# Patient Record
Sex: FEMALE | Race: WHITE | NOT HISPANIC OR LATINO | URBAN - METROPOLITAN AREA
[De-identification: names, ages, dates, MRNs, and addresses within clinical notes are randomized per-mention and may not be internally consistent; named-entity substitution may affect disease eponyms.]

---

## 2020-02-06 ENCOUNTER — EMERGENCY (EMERGENCY)
Facility: HOSPITAL | Age: 35
LOS: 1 days | Discharge: HOME | End: 2020-02-06
Admitting: EMERGENCY MEDICINE
Payer: MEDICAID

## 2020-02-06 VITALS
SYSTOLIC BLOOD PRESSURE: 114 MMHG | RESPIRATION RATE: 17 BRPM | OXYGEN SATURATION: 98 % | TEMPERATURE: 98 F | HEART RATE: 107 BPM | DIASTOLIC BLOOD PRESSURE: 72 MMHG

## 2020-02-06 PROCEDURE — 99285 EMERGENCY DEPT VISIT HI MDM: CPT
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
## 2020-02-06 NOTE — ED PROVIDER NOTE - PROGRESS NOTE DETAILS
TAHIRA: I was signed out this pt pending PSYCH consult and recs. Psych saw pt, they do not think pt requires admission for her condition. Labs reviewed, pt is alcohol drinker with minimal elevation in LFTs, She also has UTI which was treated here with Keflex, and rest of Rx will be sent to her pharmacy of choice. Will rec to f/u with her PMD to ensure resolution of her UTI.

## 2020-02-06 NOTE — ED PROVIDER NOTE - CLINICAL SUMMARY MEDICAL DECISION MAKING FREE TEXT BOX
34 year old female history of depression with previous SI attempt presents for worsening depression, feeling weak 2/2 not eating and passive SI.  Will do labs, EKG, psych consult.

## 2020-02-06 NOTE — ED ADULT NURSE NOTE - OBJECTIVE STATEMENT
Pt received to  endorsing SI, decreased appetite and body aches upon movement. Pt a&ox3 and ambulatory at baseline, pt calm and cooperative, denies HI, denies plan to harm self. Pt denies auditory and visual hallucinations. CLEM Melgar assessing pt, pt denies alcohol, marijuana, cocaine and heroin use. Will continue to monitor.

## 2020-02-06 NOTE — ED PROVIDER NOTE - PATIENT PORTAL LINK FT
You can access the FollowMyHealth Patient Portal offered by [facility] by registering at the following website: [email or web address]. By joining [employer]’s FollowMyHealth portal, you will also be able to view your health information using other applications (apps) compatible with our system.

## 2020-02-06 NOTE — ED PROVIDER NOTE - NSFOLLOWUPINSTRUCTIONS_ED_ALL_ED_FT
Please follow up with your primary care doctor after you leave the emergency department so that they can follow up and conduct more testing and treatment as they deem necessary. If you have worsening signs or symptoms of what you came in to the Emergency Department today and are not able to see your doctor, go to your nearest emergency department or return to the [facility] emergency department for further care and management.

## 2020-02-07 VITALS
SYSTOLIC BLOOD PRESSURE: 106 MMHG | OXYGEN SATURATION: 98 % | TEMPERATURE: 98 F | DIASTOLIC BLOOD PRESSURE: 75 MMHG | HEART RATE: 91 BPM | RESPIRATION RATE: 18 BRPM

## 2020-02-07 DIAGNOSIS — F32.9 MAJOR DEPRESSIVE DISORDER, SINGLE EPISODE, UNSPECIFIED: ICD-10-CM

## 2020-02-07 LAB
ALBUMIN SERPL ELPH-MCNC: 4.9 G/DL — SIGNIFICANT CHANGE UP (ref 3.3–5)
ALP SERPL-CCNC: 126 U/L — HIGH (ref 40–120)
ALT FLD-CCNC: 49 U/L — HIGH (ref 4–33)
AMPHET UR-MCNC: NEGATIVE — SIGNIFICANT CHANGE UP
ANION GAP SERPL CALC-SCNC: 23 MMO/L — HIGH (ref 7–14)
APAP SERPL-MCNC: < 15 UG/ML — LOW (ref 15–25)
APPEARANCE UR: CLEAR — SIGNIFICANT CHANGE UP
AST SERPL-CCNC: 75 U/L — HIGH (ref 4–32)
BACTERIA # UR AUTO: HIGH
BARBITURATES UR SCN-MCNC: NEGATIVE — SIGNIFICANT CHANGE UP
BASOPHILS # BLD AUTO: 0.04 K/UL — SIGNIFICANT CHANGE UP (ref 0–0.2)
BASOPHILS NFR BLD AUTO: 0.5 % — SIGNIFICANT CHANGE UP (ref 0–2)
BENZODIAZ UR-MCNC: NEGATIVE — SIGNIFICANT CHANGE UP
BILIRUB SERPL-MCNC: 0.5 MG/DL — SIGNIFICANT CHANGE UP (ref 0.2–1.2)
BILIRUB UR-MCNC: NEGATIVE — SIGNIFICANT CHANGE UP
BLOOD UR QL VISUAL: NEGATIVE — SIGNIFICANT CHANGE UP
BUN SERPL-MCNC: 17 MG/DL — SIGNIFICANT CHANGE UP (ref 7–23)
CALCIUM SERPL-MCNC: 10.2 MG/DL — SIGNIFICANT CHANGE UP (ref 8.4–10.5)
CANNABINOIDS UR-MCNC: NEGATIVE — SIGNIFICANT CHANGE UP
CHLORIDE SERPL-SCNC: 103 MMOL/L — SIGNIFICANT CHANGE UP (ref 98–107)
CO2 SERPL-SCNC: 15 MMOL/L — LOW (ref 22–31)
COCAINE METAB.OTHER UR-MCNC: NEGATIVE — SIGNIFICANT CHANGE UP
COLOR SPEC: YELLOW — SIGNIFICANT CHANGE UP
CREAT SERPL-MCNC: 0.71 MG/DL — SIGNIFICANT CHANGE UP (ref 0.5–1.3)
EOSINOPHIL # BLD AUTO: 0 K/UL — SIGNIFICANT CHANGE UP (ref 0–0.5)
EOSINOPHIL NFR BLD AUTO: 0 % — SIGNIFICANT CHANGE UP (ref 0–6)
ETHANOL BLD-MCNC: < 10 MG/DL — SIGNIFICANT CHANGE UP
GLUCOSE SERPL-MCNC: 65 MG/DL — LOW (ref 70–99)
GLUCOSE UR-MCNC: NEGATIVE — SIGNIFICANT CHANGE UP
HCG SERPL-ACNC: < 5 MIU/ML — SIGNIFICANT CHANGE UP
HCT VFR BLD CALC: 38.7 % — SIGNIFICANT CHANGE UP (ref 34.5–45)
HGB BLD-MCNC: 11.9 G/DL — SIGNIFICANT CHANGE UP (ref 11.5–15.5)
HYALINE CASTS # UR AUTO: NEGATIVE — SIGNIFICANT CHANGE UP
IMM GRANULOCYTES NFR BLD AUTO: 0.1 % — SIGNIFICANT CHANGE UP (ref 0–1.5)
KETONES UR-MCNC: HIGH
LEUKOCYTE ESTERASE UR-ACNC: SIGNIFICANT CHANGE UP
LYMPHOCYTES # BLD AUTO: 1.07 K/UL — SIGNIFICANT CHANGE UP (ref 1–3.3)
LYMPHOCYTES # BLD AUTO: 12.6 % — LOW (ref 13–44)
MCHC RBC-ENTMCNC: 21.7 PG — LOW (ref 27–34)
MCHC RBC-ENTMCNC: 30.7 % — LOW (ref 32–36)
MCV RBC AUTO: 70.6 FL — LOW (ref 80–100)
METHADONE UR-MCNC: NEGATIVE — SIGNIFICANT CHANGE UP
MONOCYTES # BLD AUTO: 0.22 K/UL — SIGNIFICANT CHANGE UP (ref 0–0.9)
MONOCYTES NFR BLD AUTO: 2.6 % — SIGNIFICANT CHANGE UP (ref 2–14)
NEUTROPHILS # BLD AUTO: 7.12 K/UL — SIGNIFICANT CHANGE UP (ref 1.8–7.4)
NEUTROPHILS NFR BLD AUTO: 84.2 % — HIGH (ref 43–77)
NITRITE UR-MCNC: POSITIVE — HIGH
NRBC # FLD: 0 K/UL — SIGNIFICANT CHANGE UP (ref 0–0)
OPIATES UR-MCNC: NEGATIVE — SIGNIFICANT CHANGE UP
OXYCODONE UR-MCNC: NEGATIVE — SIGNIFICANT CHANGE UP
PCP UR-MCNC: NEGATIVE — SIGNIFICANT CHANGE UP
PH UR: 5.5 — SIGNIFICANT CHANGE UP (ref 5–8)
PLATELET # BLD AUTO: 352 K/UL — SIGNIFICANT CHANGE UP (ref 150–400)
PMV BLD: 12 FL — SIGNIFICANT CHANGE UP (ref 7–13)
POTASSIUM SERPL-MCNC: 4.9 MMOL/L — SIGNIFICANT CHANGE UP (ref 3.5–5.3)
POTASSIUM SERPL-SCNC: 4.9 MMOL/L — SIGNIFICANT CHANGE UP (ref 3.5–5.3)
PROT SERPL-MCNC: 8.2 G/DL — SIGNIFICANT CHANGE UP (ref 6–8.3)
PROT UR-MCNC: 20 — SIGNIFICANT CHANGE UP
RBC # BLD: 5.48 M/UL — HIGH (ref 3.8–5.2)
RBC # FLD: 16 % — HIGH (ref 10.3–14.5)
RBC CASTS # UR COMP ASSIST: SIGNIFICANT CHANGE UP (ref 0–?)
SALICYLATES SERPL-MCNC: < 5 MG/DL — LOW (ref 15–30)
SODIUM SERPL-SCNC: 141 MMOL/L — SIGNIFICANT CHANGE UP (ref 135–145)
SP GR SPEC: 1.02 — SIGNIFICANT CHANGE UP (ref 1–1.04)
SPECIMEN SOURCE: SIGNIFICANT CHANGE UP
SQUAMOUS # UR AUTO: SIGNIFICANT CHANGE UP
TSH SERPL-MCNC: 0.28 UIU/ML — SIGNIFICANT CHANGE UP (ref 0.27–4.2)
UROBILINOGEN FLD QL: NORMAL — SIGNIFICANT CHANGE UP
WBC # BLD: 8.46 K/UL — SIGNIFICANT CHANGE UP (ref 3.8–10.5)
WBC # FLD AUTO: 8.46 K/UL — SIGNIFICANT CHANGE UP (ref 3.8–10.5)
WBC UR QL: HIGH (ref 0–?)

## 2020-02-07 PROCEDURE — 90792 PSYCH DIAG EVAL W/MED SRVCS: CPT

## 2020-02-07 RX ORDER — NICOTINE POLACRILEX 2 MG
1 GUM BUCCAL ONCE
Refills: 0 | Status: COMPLETED | OUTPATIENT
Start: 2020-02-07 | End: 2020-02-07

## 2020-02-07 RX ORDER — CEPHALEXIN 500 MG
1 CAPSULE ORAL
Qty: 14 | Refills: 0
Start: 2020-02-07 | End: 2020-02-13

## 2020-02-07 RX ORDER — ALPRAZOLAM 0.25 MG
0.25 TABLET ORAL ONCE
Refills: 0 | Status: DISCONTINUED | OUTPATIENT
Start: 2020-02-07 | End: 2020-02-07

## 2020-02-07 RX ORDER — CEPHALEXIN 500 MG
500 CAPSULE ORAL ONCE
Refills: 0 | Status: COMPLETED | OUTPATIENT
Start: 2020-02-07 | End: 2020-02-07

## 2020-02-07 RX ADMIN — Medication 0.25 MILLIGRAM(S): at 03:29

## 2020-02-07 RX ADMIN — Medication 500 MILLIGRAM(S): at 02:16

## 2020-02-07 RX ADMIN — Medication 1 PATCH: at 02:17

## 2020-02-07 NOTE — ED BEHAVIORAL HEALTH ASSESSMENT NOTE - AXIS IV
Problems with primary support/Occupational problems/Problem related to social environment/Educational problems

## 2020-02-07 NOTE — ED BEHAVIORAL HEALTH ASSESSMENT NOTE - SAFETY PLAN DETAILS
Patient advised and agreed to return to ED or nearest hospital or call 911 for any worsening symptoms.

## 2020-02-07 NOTE — ED ADULT NURSE REASSESSMENT NOTE - NS ED NURSE REASSESS COMMENT FT1
Pt discharged by MD; medicated as ordered; exit vitals as noted. Pt verbalizes that brother will be driving; verbalizes understanding of prescribed med for UTI.  Pt received discharge paperwork from MD; left ER accompanied by her brother.

## 2020-02-07 NOTE — ED BEHAVIORAL HEALTH ASSESSMENT NOTE - SUICIDE RISK FACTORS
Impulsivity/Current mood episode/Mood Disorder current/past/Alcohol/Substance abuse disorders/Agitation/Severe Anxiety/Panic/Hopelessness or despair/History of abuse/trauma
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
## 2020-02-07 NOTE — ED BEHAVIORAL HEALTH ASSESSMENT NOTE - SUMMARY
Patient is a 34 year old  woman, with self reported history of depression and substance use, with remote hospitalization and suicide attempt and self injurious behavior, who presents brought in by brother for depression.    Patient denies acute symptoms of depression, yaz, anxiety, psychosis, suicidal/homicidal ideations, intent or plans, denies auditory/visual hallucinations.  Patient does not represent an imminent threat of danger to self or others at this time.  Patient does not meet criteria for inpatient involuntary hospitalization.  Patient refused voluntary admission.  Patient will be discharged home and patient and brother agree to discharge disposition.  No acute safety concerns.

## 2020-02-07 NOTE — ED BEHAVIORAL HEALTH ASSESSMENT NOTE - REFERRAL / APPOINTMENT DETAILS
Patient given information for [facility] in [place]. Patient given information for crisis clinic and substance use referrals
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
## 2020-02-07 NOTE — ED BEHAVIORAL HEALTH ASSESSMENT NOTE - DETAILS
N/A states yeah doesn't everybody have that kind of history." patient and brother aware of disposition and plan

## 2020-02-07 NOTE — ED BEHAVIORAL HEALTH ASSESSMENT NOTE - DESCRIPTION
Patient was relatively calm, but demanding in the ED and did not exhibit any aggression. Patient did not require any PRN medications or any physical restraints.    Vital Signs Last 24 Hrs  T(C): 36.7 (07 Feb 2020 03:30), Max: 36.7 (06 Feb 2020 22:54)  T(F): 98.1 (07 Feb 2020 03:30), Max: 98.1 (06 Feb 2020 22:54)  HR: 91 (07 Feb 2020 03:30) (91 - 107)  BP: 106/75 (07 Feb 2020 03:30) (106/75 - 114/72)  BP(mean): --  RR: 18 (07 Feb 2020 03:30) (17 - 18)  SpO2: 98% (07 Feb 2020 03:30) (98% - 98%) denies lives alone, previously employed in administration

## 2020-02-07 NOTE — ED BEHAVIORAL HEALTH ASSESSMENT NOTE - RISK ASSESSMENT
Acute Suicide Risk Low   Rationale:   Protective factors include no access to firearms, no global insomnia, supportive family and social supports, no suicidal/homicidal ideations intent or plans, hopefulness for future    Risk factors of history of prior suicide attempts, self injurious behaviors, history of psychiatric disorders including mood disorders, history of substance use; symptoms of impulsivity, hopelessness, triggering events leading to shame, humiliation, or despair    Elevated Chronic Risk   YES secondary to substance use Low Acute Suicide Risk

## 2020-02-09 LAB
-  AMIKACIN: SIGNIFICANT CHANGE UP
-  AMPICILLIN/SULBACTAM: SIGNIFICANT CHANGE UP
-  AMPICILLIN: SIGNIFICANT CHANGE UP
-  AZTREONAM: SIGNIFICANT CHANGE UP
-  CEFAZOLIN: SIGNIFICANT CHANGE UP
-  CEFEPIME: SIGNIFICANT CHANGE UP
-  CEFOXITIN: SIGNIFICANT CHANGE UP
-  CEFTAZIDIME: SIGNIFICANT CHANGE UP
-  CEFTRIAXONE: SIGNIFICANT CHANGE UP
-  ERTAPENEM: SIGNIFICANT CHANGE UP
-  GENTAMICIN: SIGNIFICANT CHANGE UP
-  IMIPENEM: SIGNIFICANT CHANGE UP
-  LEVOFLOXACIN: SIGNIFICANT CHANGE UP
-  MEROPENEM: SIGNIFICANT CHANGE UP
-  NITROFURANTOIN: SIGNIFICANT CHANGE UP
-  PIPERACILLIN/TAZOBACTAM: SIGNIFICANT CHANGE UP
-  TIGECYCLINE: SIGNIFICANT CHANGE UP
-  TOBRAMYCIN: SIGNIFICANT CHANGE UP
-  TRIMETHOPRIM/SULFAMETHOXAZOLE: SIGNIFICANT CHANGE UP
BACTERIA UR CULT: SIGNIFICANT CHANGE UP
METHOD TYPE: SIGNIFICANT CHANGE UP
ORGANISM # SPEC MICROSCOPIC CNT: SIGNIFICANT CHANGE UP
ORGANISM # SPEC MICROSCOPIC CNT: SIGNIFICANT CHANGE UP

## 2020-03-01 ENCOUNTER — OUTPATIENT (OUTPATIENT)
Dept: OUTPATIENT SERVICES | Facility: HOSPITAL | Age: 35
LOS: 1 days | End: 2020-03-01
Payer: MEDICAID

## 2020-03-01 PROCEDURE — G9001: CPT

## 2020-04-09 DIAGNOSIS — Z71.89 OTHER SPECIFIED COUNSELING: ICD-10-CM

## 2020-04-09 PROBLEM — F32.9 MAJOR DEPRESSIVE DISORDER, SINGLE EPISODE, UNSPECIFIED: Chronic | Status: ACTIVE | Noted: 2020-02-07

## 2020-08-21 NOTE — ED ADULT TRIAGE NOTE - RESPIRATORY RATE (BREATHS/MIN)
----- Message from Sergio Sen sent at 8/19/2020 11:56 AM CDT -----  Regarding: test results  Contact: patient  Type:  Test Results    Who Called:  Patient   Name of Test (Lab/Mammo/Etc):  Labwork  Date of Test:  August 13th  Ordering Provider:  Dr. Bailey  Where the test was performed:  [facility] Call Back Number:  [phone]    Case number [id number]      
Overall labs look good; cholesterol slight elevation so diet/exercise  
Pt is requesting lab results. Please advise.  
lvm for pt to call back office regarding results   
17

## 2020-09-24 NOTE — ED BEHAVIORAL HEALTH ASSESSMENT NOTE - THOUGHT CONTENT
Patient Education     Urinary Incontinence, Female (Adult)  Urinary incontinence means loss of control of the bladder. This problem affects many women, especially as they get older. If you have incontinence, you may be embarrassed to ask for help. But know that this problem can be treated.  Types of Incontinence  There are different types of incontinence. Two of the main types are described here. You can have more than one type.    Stress incontinence. With this type, urine leaks when pressure (stress) is put on the bladder. This may happen when you cough, sneeze, or laugh. Stress incontinence most often occurs because the pelvic floor muscles that support the bladder and urethra are weak. This can happen after pregnancy and vaginal childbirth or a hysterectomy. It can also be due to excess body weight or hormone changes.    Urge incontinence (also called overactive bladder). With this type, a sudden urge to urinate is felt often. This may happen even though there may not be much urine in the bladder. The need to urinate often during the night is common. Urge incontinence most often occurs because of bladder spasms. This may be due to bladder irritation or infection. Damage to bladder nerves or pelvic muscles, constipation, and certain medicines can also lead to urge incontinence.  Treatment of urinary incontinence depends on the cause. Further evaluation is needed to find the type you have. This will likely include an exam and certain tests. Based on the results, you and your healthcare provider can then plan treatment. Until a diagnosis is made, the home care tips below can help relieve symptoms.  Home care    Do pelvic floor muscle exercises, if they are prescribed. The pelvic floor muscles help support the bladder and urethra. Many women find that their symptoms improve when doing special exercises that strengthen these muscles. To do the exercises contract the muscles you would use to stop your stream of  urine, but do this when you re not urinating. Hold for 10 seconds, then relax. Repeat 10 to 20 times in a row, at least 3 times a day. Your provider may give you other instructions for how to do the exercises and how often.    Keep a bladder diary. This helps track how often and how much you urinate over a set period of time. Bring this diary with you to your next visit with the provider. The information can help your provider learn more about your bladder problem.    Lose weight, if advised to by your provider. Excess weight puts pressure on the bladder. Your provider can help you create a weight-loss plan that s right for you. This may include exercising more and making certain diet changes.    Don't consume foods and drinks that may irritate the bladder. These can include alcohol and caffeinated drinks.    Quit smoking. Smoking and other tobacco use can lead to chronic cough that strains the pelvic floor muscles. Smoking may also damage the bladder and urethra. Talk with your provider about treatments or methods you can use to quit smoking.    If drinking large amounts of fluid causes you to have symptoms, you may be advised to limit your fluid intake. You may also be advised to drink most of your fluids during the day and to limit fluids at night.    If you re worried about urine leakage or accidents, you may wear absorbent pads to catch urine. Change the pads often. This helps reduce discomfort. It may also reduce the risk of skin or bladder infections.  Follow-up care  Follow up with your healthcare provider, or as directed. It may take some to find the right treatment for your problem. Your treatment plan may include special therapies or medicines. Certain procedures or surgery may also be options. Be sure to discuss any questions you have with your provider.  When to seek medical advice  Call the healthcare provider right away if any of these occur:    Fever of 100.4 F (38 C) or higher, or as directed by  your provider    Bladder pain or fullness    Abdominal swelling    Nausea or vomiting    Back pain    Weakness, dizziness or fainting  Date Last Reviewed: 10/1/2017    3431-1092 [employer]. [address] [place] [address], [place] [address]. All rights reserved. This information is not intended as a substitute for professional medical care. Always follow your healthcare professional's instructions.            Unremarkable

## 2021-11-16 ENCOUNTER — INPATIENT (INPATIENT)
Facility: HOSPITAL | Age: 36
LOS: 8 days | Discharge: HOME | End: 2021-11-25
Attending: INTERNAL MEDICINE | Admitting: INTERNAL MEDICINE
Payer: MEDICAID

## 2021-11-16 VITALS
SYSTOLIC BLOOD PRESSURE: 111 MMHG | HEART RATE: 88 BPM | HEIGHT: 61 IN | TEMPERATURE: 98 F | OXYGEN SATURATION: 98 % | DIASTOLIC BLOOD PRESSURE: 76 MMHG | RESPIRATION RATE: 18 BRPM

## 2021-11-16 PROCEDURE — 93010 ELECTROCARDIOGRAM REPORT: CPT

## 2021-11-16 PROCEDURE — 99285 EMERGENCY DEPT VISIT HI MDM: CPT | Mod: 25

## 2021-11-16 NOTE — ED PROVIDER NOTE - NSFOLLOWUPINSTRUCTIONS_ED_ALL_ED_FT
You have been given information necessary to follow up with the  [facility] ([facility]) Crisis center & other outpatient  psychiatric clinics within your community    • [facility] walk in Crisis centre  [id number] [place] [address] [phone] [email or web address]  Hours of operation:  Day	                                        Hours  Sunday                                  Closed  Monday                                9am - 3pm  Tuesday                                9am - 3pm  Wednesday                          9am - 3pm  Thursday                               9am - 3pm  Friday                                    9am - 3pm  Saturday                                Closed    .....additionally if your current problem is associated with drug or alcohol abuse further information can be obtained at the Drug Abuse Evaluation Health Referral Servce ([facility])    • [facility] clinic [address] [place] [address] [phone] [email or web address]-service    Additionally if more support and information and help is needed in the area of suicide prevention pleas3 feel free to contact :   • Su[place] [address]  Phone: 2-209-987-RMNN (2949)  Web Address: http://www.suicidepreventionlifeline.org  • Suicide Awareness Voices of Education  [address] [place] [address] [place], [place] [id number]  Phone: 1-489.186.1072  Web Address: http://www.[employer].org    -----  [facility] - Urology  Urology  [address] [facility], 3rd & 4th [place] [address]  Phone: [phone]    Urinary Tract Infection in Women    WHAT YOU NEED TO KNOW:    A urinary tract infection (UTI) is caused by bacteria that get inside your urinary tract. Most bacteria that enter your urinary tract come out when you urinate. If the bacteria stay in your urinary tract, you may get an infection. Your urinary tract includes your kidneys, ureters, bladder, and urethra. Urine is made in your kidneys, and it flows from the ureters to the bladder. Urine leaves the bladder through the urethra. A UTI is more common in your lower urinary tract, which includes your bladder and urethra.  Female Urinary System    DISCHARGE INSTRUCTIONS:    Seek care immediately if:    You are urinating very little or not at all.    You have a high fever with shaking chills.    You have side or back pain that gets worse.  Call your doctor if:    You have a fever.    You do not feel better after 2 days of taking antibiotics.    You are vomiting.    You have questions or concerns about your condition or care.  Medicines:    Antibiotics help fight a bacterial infection. If you have UTIs often (called recurrent UTIs), you may be given antibiotics to take regularly. You will be given directions for when and how to use antibiotics. The goal is to prevent UTIs but not cause antibiotic resistance by using antibiotics too often.    Medicines may be given to decrease pain and burning when you urinate. They will also help decrease the feeling that you need to urinate often. These medicines will make your urine orange or red.    Take your medicine as directed. Contact your healthcare provider if you think your medicine is not helping or if you have side effects. Tell him or her if you are allergic to any medicine. Keep a list of the medicines, vitamins, and herbs you take. Include the amounts, and when and why you take them. Bring the list or the pill bottles to follow-up visits. Carry your medicine list with you in case of an emergency.  Follow up with your healthcare provider as directed: Write down your questions so you remember to ask them during your visits.    Prevent another UTI:    Empty your bladder often. Urinate and empty your bladder as soon as you feel the need. Do not hold your urine for long periods of time.    Wipe from front to back after you urinate or have a bowel movement. This will help prevent germs from getting into your urinary tract through your urethra.    Drink liquids as directed. Ask how much liquid to drink each day and which liquids are best for you. You may need to drink more liquids than usual to help flush out the bacteria. Do not drink alcohol, caffeine, or citrus juices. These can irritate your bladder and increase your symptoms. Your healthcare provider may recommend cranberry juice to help prevent a UTI.    Urinate after you have sex. This can help flush out bacteria passed during sex.    Do not douche or use feminine deodorants. These can change the chemical balance in your vagina.    Change sanitary pads or tampons often. This will help prevent germs from getting into your urinary tract.    Talk to your healthcare provider about your birth control method. You may need to change your method if it is increasing your risk for UTIs.    Wear cotton underwear and clothes that are loose. Tight pants and nylon underwear can trap moisture and cause bacteria to grow.    Vaginal estrogen may be recommended. This medicine helps prevent UTIs in women who have gone through menopause or are in bere-menopause.    Do pelvic muscle exercises often. Pelvic muscle exercises may help you start and stop urinating. Strong pelvic muscles may help you empty your bladder easier. Squeeze these muscles tightly for 5 seconds like you are trying to hold back urine. Then relax for 5 seconds. Gradually work up to squeezing for 10 seconds. Do 3 sets of 15 repetitions a day, or as directed.

## 2021-11-16 NOTE — ED PROVIDER NOTE - PHYSICAL EXAMINATION
GENERAL: A&Ox3, non-toxic appearing, no acute distress  HEENT: NCAT, EOMI, oral mucosa moist, normal conjunctiva  RESP: CTAB, no respiratory distress, no wheezes/rhonchi/rales, speaking in full sentences  CV: RRR, no murmurs/rubs/gallops  ABDOMEN: soft, non-tender, non-distended, no guarding, no CVA tenderness  MSK: no visible deformities  NEURO: no focal sensory or motor deficits, CN 2-12 grossly intact, ataxic gait  SKIN: warm, normal color, well perfused, no rash  PSYCH: normal affect

## 2021-11-16 NOTE — ED PROVIDER NOTE - CLINICAL SUMMARY MEDICAL DECISION MAKING FREE TEXT BOX
David Beck, PGY3: 35 year old female p/w major depression, difficulty walking, and decreased PO intake. C/f MDD. Plan for psych clearance labs, IVF, likely admit to medicine vs psych.

## 2021-11-16 NOTE — ED ADULT TRIAGE NOTE - CHIEF COMPLAINT QUOTE
Pt brought in by EMS from home for evaluation for psych and medical. Pts mother said she has had a decrease in PO intake and has had weight loss. Pt states she has been feeling depressed denies SI/HI. Pt denies chest pain, sob, n/v/d, fever or chills. Pt brought in by EMS from home for evaluation for psych and medical. Pts mother said she has had a decrease in PO intake and has had weight loss. Pt states she has been feeling depressed denies SI/HI. Pt denies chest pain, sob, n/v/d, fever or chills.  [phone] mom Pt brought in by EMS from home for evaluation for psych and medical. Pts mother said she has had a decrease in PO intake and has had weight loss. Pt states she has been feeling depressed denies SI/HI. Pt denies chest pain, sob, n/v/d, fever or chills. Pts mom states she lives with her brother and is not sure that is a safe option because when she is there she is not happy or eating properly.   [phone] mom

## 2021-11-16 NOTE — ED ADULT NURSE NOTE - NSIMPLEMENTINTERV_GEN_ALL_ED
Implemented All Fall Risk Interventions:  [place] to call system. Call bell, personal items and telephone within reach. Instruct patient to call for assistance. Room bathroom lighting operational. Non-slip footwear when patient is off stretcher. Physically safe environment: no spills, clutter or unnecessary equipment. Stretcher in lowest position, wheels locked, appropriate side rails in place. Provide visual cue, wrist band, yellow gown, etc. Monitor gait and stability. Monitor for mental status changes and reorient to person, place, and time. Review medications for side effects contributing to fall risk. Reinforce activity limits and safety measures with patient and family.

## 2021-11-16 NOTE — ED PROVIDER NOTE - PROGRESS NOTE DETAILS
David Beck, PGY3: Patient medically cleared, psych consulted. Will see patient in ED. David Beck, PGY3: Patient cleared by psych. Patient offered medical admission for tremors and patient declined. Labs non-actionable, no overt signs of malnutrition. UA+, will tx w/ Keflex. Discussed return precautions and all questions answered. Pt in agreement w/ plan. CAOx3, NAD, VSS. Stable for d/c. David Beck, PGY3: Brother's cell "out of service" when attempted at [phone]. Left VM with mother at [phone]. David Beck, PGY3: Spoke w/ patient's mother. Patient now agreeable for medical admission. Hospitalist paged.

## 2021-11-16 NOTE — ED PROVIDER NOTE - ATTENDING CONTRIBUTION TO CARE
35 year old with decreased po intake and weight loss. most likely psych. concern for severe electrolyte abn. labs lytes, fluids, psych cx, admission med vs psych depending on lab results

## 2021-11-16 NOTE — ED PROVIDER NOTE - OBJECTIVE STATEMENT
35 year old female presents to ED for depression. Patient states she currently lives with her brother and barely eats. She will drink ensure, eat some protein bars, but mainly only eats 1 meal per day. She has been having difficulty walking recently and "feels wobbly". She states she feels like she should weigh 20 lbs more. She states she vapes a lot and smokes CBD to help her sleep. She denies other recreational drug or alcohol use. Mother called 911 because she was concerned that patient is not eating and is not safe at the brother's house. Patient states she has hx of depression, was on Wellbutrin as a teen but no longer taking, does not follow w/ psychiatrist. Had been admitted for psych in past, many years ago. She denies active SI/HI/AVH. She denies f/c, cp, abd pain, n/v/d, or dysuria. LMP "few weeks ago".

## 2021-11-16 NOTE — ED PROVIDER NOTE - PATIENT PORTAL LINK FT
You can access the FollowMyHealth Patient Portal offered by [facility] by registering at the following website: [email or web address]. By joining [employer]’s FollowMyHealth portal, you will also be able to view your health information using other applications (apps) compatible with our system.

## 2021-11-16 NOTE — ED PROVIDER NOTE - NS ED ROS FT
GENERAL: no fever, no chills  EYES: no change in vision, no irritation, no discharge, no redness, no pain  HEENT: no trouble swallowing or speaking, no throat pain, no ear pain  CARDIAC: no chest pain, no palpitations   PULMONARY: no cough, no shortness of breath, no wheezing  GI: no abdominal pain, no nausea, no vomiting, no diarrhea, no constipation  : no changes in urination, no dysuria, no frequency  SKIN: no rashes  NEURO: no headache, no numbness, +weakness  MSK: no joint pain, no muscle pain, no back pain, no calf pain  PSYCH: +depression

## 2021-11-16 NOTE — ED PROVIDER NOTE - CARE PLAN
1 Principal Discharge DX:	Major depression   Principal Discharge DX:	Major depression  Secondary Diagnosis:	UTI (urinary tract infection)   Principal Discharge DX:	FTT (failure to thrive) in adult  Secondary Diagnosis:	UTI (urinary tract infection)

## 2021-11-16 NOTE — ED ADULT NURSE NOTE - OBJECTIVE STATEMENT
Pt received in rm 10. Brought from home, accompanied by EMS and mother. States she has decreased PO intake with weight loss and has been feeling depressed. Pt also states has not been able to walk properly because she feels weak. Pt currently lives with older brother and his family. Verbalized feeling safe living there but "uncomfortable." Denies SI/HI. A&Ox4, ambulatory at baseline. Breathing even and unlabored. Vitals stable as noted. Pt denies abd pain, chest pain, headache, dizziness, SOB, nausea or vomiting. 20G IV placed on left AC. Labs drawn and sent. Pending urine samples. Will continue to monitor.

## 2021-11-16 NOTE — ED ADULT NURSE NOTE - CHIEF COMPLAINT QUOTE
Pt brought in by EMS from home for evaluation for psych and medical. Pts mother said she has had a decrease in PO intake and has had weight loss. Pt states she has been feeling depressed denies SI/HI. Pt denies chest pain, sob, n/v/d, fever or chills. Pts mom states she lives with her brother and is not sure that is a safe option because when she is there she is not happy or eating properly.   [phone] mom

## 2021-11-17 DIAGNOSIS — Z98.890 OTHER SPECIFIED POSTPROCEDURAL STATES: Chronic | ICD-10-CM

## 2021-11-17 DIAGNOSIS — F41.9 ANXIETY DISORDER, UNSPECIFIED: ICD-10-CM

## 2021-11-17 DIAGNOSIS — R62.7 ADULT FAILURE TO THRIVE: ICD-10-CM

## 2021-11-17 DIAGNOSIS — R45.851 SUICIDAL IDEATIONS: ICD-10-CM

## 2021-11-17 DIAGNOSIS — N39.0 URINARY TRACT INFECTION, SITE NOT SPECIFIED: ICD-10-CM

## 2021-11-17 DIAGNOSIS — R26.81 UNSTEADINESS ON FEET: ICD-10-CM

## 2021-11-17 DIAGNOSIS — F32.9 MAJOR DEPRESSIVE DISORDER, SINGLE EPISODE, UNSPECIFIED: ICD-10-CM

## 2021-11-17 DIAGNOSIS — Z29.9 ENCOUNTER FOR PROPHYLACTIC MEASURES, UNSPECIFIED: ICD-10-CM

## 2021-11-17 LAB
ALBUMIN SERPL ELPH-MCNC: 4.4 G/DL — SIGNIFICANT CHANGE UP (ref 3.3–5)
ALBUMIN SERPL ELPH-MCNC: 4.9 G/DL — SIGNIFICANT CHANGE UP (ref 3.3–5)
ALP SERPL-CCNC: 119 U/L — SIGNIFICANT CHANGE UP (ref 40–120)
ALP SERPL-CCNC: 136 U/L — HIGH (ref 40–120)
ALT FLD-CCNC: 13 U/L — SIGNIFICANT CHANGE UP (ref 4–33)
ALT FLD-CCNC: 14 U/L — SIGNIFICANT CHANGE UP (ref 4–33)
ANION GAP SERPL CALC-SCNC: 11 MMOL/L — SIGNIFICANT CHANGE UP (ref 7–14)
ANION GAP SERPL CALC-SCNC: 12 MMOL/L — SIGNIFICANT CHANGE UP (ref 7–14)
APAP SERPL-MCNC: <5 UG/ML — LOW (ref 15–25)
APPEARANCE UR: ABNORMAL
AST SERPL-CCNC: 14 U/L — SIGNIFICANT CHANGE UP (ref 4–32)
AST SERPL-CCNC: 19 U/L — SIGNIFICANT CHANGE UP (ref 4–32)
BASOPHILS # BLD AUTO: 0.05 K/UL — SIGNIFICANT CHANGE UP (ref 0–0.2)
BASOPHILS # BLD AUTO: 0.21 K/UL — HIGH (ref 0–0.2)
BASOPHILS NFR BLD AUTO: 0.8 % — SIGNIFICANT CHANGE UP (ref 0–2)
BASOPHILS NFR BLD AUTO: 2.7 % — HIGH (ref 0–2)
BILIRUB SERPL-MCNC: 0.4 MG/DL — SIGNIFICANT CHANGE UP (ref 0.2–1.2)
BILIRUB SERPL-MCNC: 0.5 MG/DL — SIGNIFICANT CHANGE UP (ref 0.2–1.2)
BILIRUB UR-MCNC: NEGATIVE — SIGNIFICANT CHANGE UP
BUN SERPL-MCNC: 19 MG/DL — SIGNIFICANT CHANGE UP (ref 7–23)
BUN SERPL-MCNC: 21 MG/DL — SIGNIFICANT CHANGE UP (ref 7–23)
CALCIUM SERPL-MCNC: 10 MG/DL — SIGNIFICANT CHANGE UP (ref 8.4–10.5)
CALCIUM SERPL-MCNC: 9.4 MG/DL — SIGNIFICANT CHANGE UP (ref 8.4–10.5)
CHLORIDE SERPL-SCNC: 105 MMOL/L — SIGNIFICANT CHANGE UP (ref 98–107)
CHLORIDE SERPL-SCNC: 108 MMOL/L — HIGH (ref 98–107)
CHOLEST SERPL-MCNC: 113 MG/DL — SIGNIFICANT CHANGE UP
CO2 SERPL-SCNC: 23 MMOL/L — SIGNIFICANT CHANGE UP (ref 22–31)
CO2 SERPL-SCNC: 25 MMOL/L — SIGNIFICANT CHANGE UP (ref 22–31)
COLOR SPEC: YELLOW — SIGNIFICANT CHANGE UP
CREAT SERPL-MCNC: 0.61 MG/DL — SIGNIFICANT CHANGE UP (ref 0.5–1.3)
CREAT SERPL-MCNC: 0.75 MG/DL — SIGNIFICANT CHANGE UP (ref 0.5–1.3)
DIFF PNL FLD: NEGATIVE — SIGNIFICANT CHANGE UP
EOSINOPHIL # BLD AUTO: 0.2 K/UL — SIGNIFICANT CHANGE UP (ref 0–0.5)
EOSINOPHIL # BLD AUTO: 0.21 K/UL — SIGNIFICANT CHANGE UP (ref 0–0.5)
EOSINOPHIL NFR BLD AUTO: 2.7 % — SIGNIFICANT CHANGE UP (ref 0–6)
EOSINOPHIL NFR BLD AUTO: 3.1 % — SIGNIFICANT CHANGE UP (ref 0–6)
ETHANOL SERPL-MCNC: <10 MG/DL — SIGNIFICANT CHANGE UP
FOLATE SERPL-MCNC: 18.7 NG/ML — HIGH (ref 3.1–17.5)
GLUCOSE SERPL-MCNC: 86 MG/DL — SIGNIFICANT CHANGE UP (ref 70–99)
GLUCOSE SERPL-MCNC: 87 MG/DL — SIGNIFICANT CHANGE UP (ref 70–99)
GLUCOSE UR QL: NEGATIVE — SIGNIFICANT CHANGE UP
HCG SERPL-ACNC: <5 MIU/ML — SIGNIFICANT CHANGE UP
HCT VFR BLD CALC: 36.6 % — SIGNIFICANT CHANGE UP (ref 34.5–45)
HCT VFR BLD CALC: 39.8 % — SIGNIFICANT CHANGE UP (ref 34.5–45)
HDLC SERPL-MCNC: 44 MG/DL — LOW
HGB BLD-MCNC: 12.1 G/DL — SIGNIFICANT CHANGE UP (ref 11.5–15.5)
HGB BLD-MCNC: 12.9 G/DL — SIGNIFICANT CHANGE UP (ref 11.5–15.5)
IANC: 3.2 K/UL — SIGNIFICANT CHANGE UP (ref 1.5–8.5)
IANC: 4.34 K/UL — SIGNIFICANT CHANGE UP (ref 1.5–8.5)
IMM GRANULOCYTES NFR BLD AUTO: 0.3 % — SIGNIFICANT CHANGE UP (ref 0–1.5)
KETONES UR-MCNC: NEGATIVE — SIGNIFICANT CHANGE UP
LEUKOCYTE ESTERASE UR-ACNC: ABNORMAL
LIPID PNL WITH DIRECT LDL SERPL: 61 MG/DL — SIGNIFICANT CHANGE UP
LYMPHOCYTES # BLD AUTO: 1.8 K/UL — SIGNIFICANT CHANGE UP (ref 1–3.3)
LYMPHOCYTES # BLD AUTO: 2.45 K/UL — SIGNIFICANT CHANGE UP (ref 1–3.3)
LYMPHOCYTES # BLD AUTO: 23.4 % — SIGNIFICANT CHANGE UP (ref 13–44)
LYMPHOCYTES # BLD AUTO: 38.5 % — SIGNIFICANT CHANGE UP (ref 13–44)
MAGNESIUM SERPL-MCNC: 2.4 MG/DL — SIGNIFICANT CHANGE UP (ref 1.6–2.6)
MAGNESIUM SERPL-MCNC: 2.4 MG/DL — SIGNIFICANT CHANGE UP (ref 1.6–2.6)
MCHC RBC-ENTMCNC: 22.5 PG — LOW (ref 27–34)
MCHC RBC-ENTMCNC: 22.5 PG — LOW (ref 27–34)
MCHC RBC-ENTMCNC: 32.4 GM/DL — SIGNIFICANT CHANGE UP (ref 32–36)
MCHC RBC-ENTMCNC: 33.1 GM/DL — SIGNIFICANT CHANGE UP (ref 32–36)
MCV RBC AUTO: 68 FL — LOW (ref 80–100)
MCV RBC AUTO: 69.3 FL — LOW (ref 80–100)
MONOCYTES # BLD AUTO: 0.28 K/UL — SIGNIFICANT CHANGE UP (ref 0–0.9)
MONOCYTES # BLD AUTO: 0.45 K/UL — SIGNIFICANT CHANGE UP (ref 0–0.9)
MONOCYTES NFR BLD AUTO: 3.6 % — SIGNIFICANT CHANGE UP (ref 2–14)
MONOCYTES NFR BLD AUTO: 7.1 % — SIGNIFICANT CHANGE UP (ref 2–14)
NEUTROPHILS # BLD AUTO: 3.2 K/UL — SIGNIFICANT CHANGE UP (ref 1.8–7.4)
NEUTROPHILS # BLD AUTO: 4.38 K/UL — SIGNIFICANT CHANGE UP (ref 1.8–7.4)
NEUTROPHILS NFR BLD AUTO: 50.2 % — SIGNIFICANT CHANGE UP (ref 43–77)
NEUTROPHILS NFR BLD AUTO: 56.8 % — SIGNIFICANT CHANGE UP (ref 43–77)
NITRITE UR-MCNC: POSITIVE
NON HDL CHOLESTEROL: 69 MG/DL — SIGNIFICANT CHANGE UP
NRBC # BLD: 0 /100 WBCS — SIGNIFICANT CHANGE UP
NRBC # FLD: 0 K/UL — SIGNIFICANT CHANGE UP
PCP SPEC-MCNC: SIGNIFICANT CHANGE UP
PH UR: 6.5 — SIGNIFICANT CHANGE UP (ref 5–8)
PHOSPHATE SERPL-MCNC: 4.3 MG/DL — SIGNIFICANT CHANGE UP (ref 2.5–4.5)
PLATELET # BLD AUTO: 312 K/UL — SIGNIFICANT CHANGE UP (ref 150–400)
PLATELET # BLD AUTO: 317 K/UL — SIGNIFICANT CHANGE UP (ref 150–400)
POTASSIUM SERPL-MCNC: 4.2 MMOL/L — SIGNIFICANT CHANGE UP (ref 3.5–5.3)
POTASSIUM SERPL-MCNC: 4.4 MMOL/L — SIGNIFICANT CHANGE UP (ref 3.5–5.3)
POTASSIUM SERPL-SCNC: 4.2 MMOL/L — SIGNIFICANT CHANGE UP (ref 3.5–5.3)
POTASSIUM SERPL-SCNC: 4.4 MMOL/L — SIGNIFICANT CHANGE UP (ref 3.5–5.3)
PROT SERPL-MCNC: 7 G/DL — SIGNIFICANT CHANGE UP (ref 6–8.3)
PROT SERPL-MCNC: 8.1 G/DL — SIGNIFICANT CHANGE UP (ref 6–8.3)
PROT UR-MCNC: ABNORMAL
RBC # BLD: 5.38 M/UL — HIGH (ref 3.8–5.2)
RBC # BLD: 5.74 M/UL — HIGH (ref 3.8–5.2)
RBC # FLD: 15.3 % — HIGH (ref 10.3–14.5)
RBC # FLD: 15.3 % — HIGH (ref 10.3–14.5)
SALICYLATES SERPL-MCNC: <0.3 MG/DL — LOW (ref 15–30)
SARS-COV-2 RNA SPEC QL NAA+PROBE: SIGNIFICANT CHANGE UP
SODIUM SERPL-SCNC: 142 MMOL/L — SIGNIFICANT CHANGE UP (ref 135–145)
SODIUM SERPL-SCNC: 142 MMOL/L — SIGNIFICANT CHANGE UP (ref 135–145)
SP GR SPEC: 1.03 — SIGNIFICANT CHANGE UP (ref 1–1.05)
TRIGL SERPL-MCNC: 42 MG/DL — SIGNIFICANT CHANGE UP
TSH SERPL-MCNC: 1.41 UIU/ML — SIGNIFICANT CHANGE UP (ref 0.27–4.2)
UROBILINOGEN FLD QL: SIGNIFICANT CHANGE UP
VIT B12 SERPL-MCNC: 884 PG/ML — SIGNIFICANT CHANGE UP (ref 200–900)
WBC # BLD: 6.37 K/UL — SIGNIFICANT CHANGE UP (ref 3.8–10.5)
WBC # BLD: 7.71 K/UL — SIGNIFICANT CHANGE UP (ref 3.8–10.5)
WBC # FLD AUTO: 6.37 K/UL — SIGNIFICANT CHANGE UP (ref 3.8–10.5)
WBC # FLD AUTO: 7.71 K/UL — SIGNIFICANT CHANGE UP (ref 3.8–10.5)

## 2021-11-17 PROCEDURE — 99223 1ST HOSP IP/OBS HIGH 75: CPT

## 2021-11-17 PROCEDURE — 70450 CT HEAD/BRAIN W/O DYE: CPT | Mod: 26

## 2021-11-17 PROCEDURE — 71046 X-RAY EXAM CHEST 2 VIEWS: CPT | Mod: 26

## 2021-11-17 PROCEDURE — 90792 PSYCH DIAG EVAL W/MED SRVCS: CPT

## 2021-11-17 RX ORDER — LANOLIN ALCOHOL/MO/W.PET/CERES
3 CREAM (GRAM) TOPICAL AT BEDTIME
Refills: 0 | Status: DISCONTINUED | OUTPATIENT
Start: 2021-11-17 | End: 2021-11-21

## 2021-11-17 RX ORDER — CEPHALEXIN 500 MG
1 CAPSULE ORAL
Qty: 20 | Refills: 0
Start: 2021-11-17 | End: 2021-11-21

## 2021-11-17 RX ORDER — ACETAMINOPHEN 500 MG
650 TABLET ORAL EVERY 6 HOURS
Refills: 0 | Status: DISCONTINUED | OUTPATIENT
Start: 2021-11-17 | End: 2021-11-25

## 2021-11-17 RX ORDER — CEFTRIAXONE 500 MG/1
1000 INJECTION, POWDER, FOR SOLUTION INTRAMUSCULAR; INTRAVENOUS EVERY 24 HOURS
Refills: 0 | Status: COMPLETED | OUTPATIENT
Start: 2021-11-17 | End: 2021-11-19

## 2021-11-17 RX ORDER — HYDROXYZINE HCL 10 MG
25 TABLET ORAL EVERY 6 HOURS
Refills: 0 | Status: DISCONTINUED | OUTPATIENT
Start: 2021-11-17 | End: 2021-11-25

## 2021-11-17 RX ORDER — NICOTINE POLACRILEX 2 MG
1 GUM BUCCAL DAILY
Refills: 0 | Status: DISCONTINUED | OUTPATIENT
Start: 2021-11-17 | End: 2021-11-25

## 2021-11-17 RX ADMIN — Medication 1 PATCH: at 21:27

## 2021-11-17 RX ADMIN — Medication 1 PATCH: at 13:48

## 2021-11-17 RX ADMIN — Medication 25 MILLIGRAM(S): at 13:48

## 2021-11-17 RX ADMIN — Medication 3 MILLIGRAM(S): at 21:19

## 2021-11-17 RX ADMIN — CEFTRIAXONE 100 MILLIGRAM(S): 500 INJECTION, POWDER, FOR SOLUTION INTRAMUSCULAR; INTRAVENOUS at 11:25

## 2021-11-17 NOTE — ED BEHAVIORAL HEALTH ASSESSMENT NOTE - NSBHROSSTATEMENT_PSY_A_CORE
-Shingrix vaccine recommended. Can get at outside pharmacy as we do not have it in stock. It is a 2 dose series, given 2-6 months apart.   -Continue to eat a heart healthy diet and exercise.   -Fax the Power of  documents to Fax #: [phone].   -Follow up with Cardiology as you have not seen him since 2017.   -Return to clinic in 1 years or sooner if needed.      .
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
## 2021-11-17 NOTE — BH CONSULTATION LIAISON PROGRESS NOTE - OTHER
currently living with brother and brother's family for > 1 yr now. is unemployed with resultant financial hardship pt endorses weakness/unsteadiness with ambulation, gait not assessed

## 2021-11-17 NOTE — ED BEHAVIORAL HEALTH ASSESSMENT NOTE - VIOLENCE PROTECTIVE FACTORS:
Residential stability Residential stability/Insight into violence risk and need for management/treatment

## 2021-11-17 NOTE — H&P ADULT - NEGATIVE MUSCULOSKELETAL SYMPTOMS
no arthralgia/no arthritis/no muscle cramps/no muscle weakness/no stiffness/no neck pain/no arm pain L/no arm pain R/no back pain/no leg pain L/no leg pain R

## 2021-11-17 NOTE — H&P ADULT - PROBLEM SELECTOR PLAN 1
Psych consult requested  No indication for emergent psych meds at this time  Admit to telemetry  Normal diet  Fall risk precautions Denies eating disorder and states she feels hungry and wants to eat  Albumin, TSH, and electrolytes WNL  DC tele  obtain calorie count, nutrition consult  Regular diet ordered with ensure  Reactive lymphocytes on CBC: Check STD panel, possibly related to UTI?
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
## 2021-11-17 NOTE — H&P ADULT - NSHPSOCIALHISTORY_GEN_ALL_CORE
Single. Unemployed. Does not attend University. Lives with brother's family. Former cigarette smoker, 1 PPD for 10 years. Currently vapes daily throughout the day. Uses a CBD pen daily for sleep. Denies any alcohol use or other current illicit drug use.

## 2021-11-17 NOTE — ED BEHAVIORAL HEALTH ASSESSMENT NOTE - NSACTIVEVENT_PSY_ALL_CORE
currently living with brother and brother's family for > 1 yr now. is unemployed with resultant financial hardship

## 2021-11-17 NOTE — H&P ADULT - PROBLEM SELECTOR PLAN 4
Patient currently asymptomatic  Begin Ceftriaxone atarax prn  Pt not amenable to taking psychiatric medications at this time, does not warrant involuntary psych admission/treatment given low risk for self harm.  No need for 1:1 per psych

## 2021-11-17 NOTE — H&P ADULT - NEGATIVE GENERAL GENITOURINARY SYMPTOMS
no hematuria/no renal colic/no flank pain L/no flank pain R/no urine discoloration/no incontinence/no dysuria/no urinary hesitancy/normal urinary frequency/no nocturia

## 2021-11-17 NOTE — ED BEHAVIORAL HEALTH ASSESSMENT NOTE - NS ED BHA PLAN MSU PSYCHIATRIC ISSUES2 FT
defer standing psychotropic to primary treatment team. PRN: ativan 0.5mg PO q6hrs PRN for agitation/ anxiety; PRN: ativan 1mg IM q6hrs PRN for severe agitation

## 2021-11-17 NOTE — ED BEHAVIORAL HEALTH ASSESSMENT NOTE - NSPRESENTSXS_PSY_ALL_CORE
poor eating and wt loss/Depressed mood/Anhedonia/Hopelessness or despair/Severe anxiety, agitation or panic

## 2021-11-17 NOTE — ED BEHAVIORAL HEALTH ASSESSMENT NOTE - REFERRAL / APPOINTMENT DETAILS
Patient given information for [facility] in [place]. Patient given information for crisis clinic and substance use referrals rajani resources to the community like [facility], [facility], other OP psych clinics within Pt's community like [facility]

## 2021-11-17 NOTE — H&P ADULT - PROBLEM SELECTOR PLAN 3
Psych consult requested  No indication for emergent psych meds at this time  Admit to telemetry  Normal diet  Fall risk precautions Check CTH given hx of craniotomy  PT eval  Possibly related to atrophy and deconditioning  fall precautions

## 2021-11-17 NOTE — BH CONSULTATION LIAISON PROGRESS NOTE - NSBHCHARTREVIEWLAB_PSY_A_CORE FT
12.1   6.37  )-----------( 317      ( 2021 11:32 )             36.6     11-16    142  |  105  |  19  ----------------------------<  86  4.4   |  25  |  0.75    Ca    10.0      2021 23:59  Phos  4.3     -  Mg     2.40         TPro  8.1  /  Alb  4.9  /  TBili  0.4  /  DBili  x   /  AST  19  /  ALT  13  /  AlkPhos  136<H>  -    Urinalysis Basic - ( 2021 01:49 )    Color: Yellow / Appearance: Slightly Turbid / S.028 / pH: x  Gluc: x / Ketone: Negative  / Bili: Negative / Urobili: <2 mg/dL   Blood: x / Protein: 30 mg/dL / Nitrite: Positive   Leuk Esterase: Moderate / RBC: 0-2 /HPF / WBC 25-50 /HPF   Sq Epi: x / Non Sq Epi: Few / Bacteria: Many      LIVER FUNCTIONS - ( 2021 23:59 )  Alb: 4.9 g/dL / Pro: 8.1 g/dL / ALK PHOS: 136 U/L / ALT: 13 U/L / AST: 19 U/L / GGT: x

## 2021-11-17 NOTE — ED ADULT NURSE REASSESSMENT NOTE - NS ED NURSE REASSESS COMMENT FT1
Pt in NAD, dietary called for kosher tray with "meat preference". Admitting team at pt bedside assessing pt, will continue to monitor.
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
## 2021-11-17 NOTE — H&P ADULT - NSHPPHYSICALEXAM_GEN_ALL_CORE
Vital Signs Last 24 Hrs  T(C): 36.7 (17 Nov 2021 11:28), Max: 36.9 (17 Nov 2021 03:36)  T(F): 98 (17 Nov 2021 11:28), Max: 98.4 (17 Nov 2021 03:36)  HR: 62 (17 Nov 2021 11:28) (62 - 88)  BP: 104/70 (17 Nov 2021 11:28) (102/68 - 111/86)  BP(mean): --  RR: 17 (17 Nov 2021 11:28) (16 - 18)  SpO2: 99% (17 Nov 2021 11:28) (98% - 100%)    EKG: NSR @ 78, LVH; QTC: 440 Vital Signs Last 24 Hrs  T(C): 36.7 (17 Nov 2021 11:28), Max: 36.9 (17 Nov 2021 03:36)  T(F): 98 (17 Nov 2021 11:28), Max: 98.4 (17 Nov 2021 03:36)  HR: 62 (17 Nov 2021 11:28) (62 - 88)  BP: 104/70 (17 Nov 2021 11:28) (102/68 - 111/86)  BP(mean): --  RR: 17 (17 Nov 2021 11:28) (16 - 18)  SpO2: 99% (17 Nov 2021 11:28) (98% - 100%)
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
## 2021-11-17 NOTE — ED BEHAVIORAL HEALTH ASSESSMENT NOTE - DESCRIPTION
Patient was relatively calm, but demanding in the ED and did not exhibit any aggression. Patient did not require any PRN medications or any physical restraints.    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 Pt has 6 other siblings. has associates degree. claimed she has not worked for "many yrs now". likes singing, dancing and the arts. has no pets at home. no access to guns Since her ED arrival, the Pt has been calm and cooperative.  There has been no occurrence of agitation/aggressive behavior.  No verbalization of active/ passive SI/HI.   There are no signs/symptoms of acute yaz or florid psychosis.  Pt is not showing any signs/symptoms of intoxication or withdrawal.  she is not delirious.  Pt has not tested limits.. Has maintained appropriate boundaries. Pt has been easily redirected.  Overall, there has been no management issues.    Vital Signs Last 24 Hrs  T(C): 36.9 (17 Nov 2021 03:36), Max: 36.9 (17 Nov 2021 03:36)  T(F): 98.4 (17 Nov 2021 03:36), Max: 98.4 (17 Nov 2021 03:36)  HR: 70 (17 Nov 2021 03:36) (70 - 88)  BP: 111/86 (17 Nov 2021 03:36) (103/79 - 111/86)  BP(mean): --  RR: 16 (17 Nov 2021 03:36) (16 - 18)  SpO2: 100% (17 Nov 2021 03:36) (98% - 100%)

## 2021-11-17 NOTE — ED BEHAVIORAL HEALTH ASSESSMENT NOTE - ADDITIONAL DETAILS ALL
OD on pills and cutting when she was a teen OD on pills at age 17. though Pt denied resorting to SIB, as per chart review, there was documentation that she had previously been cutting when she was a teen ager

## 2021-11-17 NOTE — ED BEHAVIORAL HEALTH ASSESSMENT NOTE - SUMMARY
Patient is a 34 year old  woman, with self reported history of depression and substance use, with remote hospitalization and suicide attempt and self injurious behavior, who presents brought in by brother for depression.    Patient denies acute symptoms of depression, yaz, anxiety, psychosis, suicidal/homicidal ideations, intent or plans, denies auditory/visual hallucinations.  Patient does not represent an imminent threat of danger to self or others at this time.  Patient does not meet criteria for inpatient involuntary hospitalization.  Patient refused voluntary admission.  Patient will be discharged home and patient and brother agree to discharge disposition.  No acute safety concerns. 35/F with self reported hx of depression and anxiety; Per Psyckes: with multiple diagnoses of MDD, unspecified anxiety disorder, other psychoactive substance related disorder and opioid related disorder. Has remote psych admission during her teen age yrs following an attempted OD on pills (reportedly at age 17);  denied engaging in any self injurious behaviors.  Today, presented to the ED BIB EMS (from home) for a medical and psychiatric evaluation.  Pt claimed she has been feeling depressed. whereas mother reported that the Pt has not been eating well with resultant weight loss.    at this time, reports experiencing symptoms of depression and anxiety of which are supposedly precipitated and perpetuated by multiple psychosocial stressors. of concern, is her decreased oral intake with resultant wt loss. Pt has no active/ passive SI/HI.   There are no signs/symptoms of acute yaz or florid psychosis.  Pt is not showing any signs/symptoms of intoxication or withdrawal.  she is not delirious.  Pt has not tested limits.. Has maintained appropriate boundaries. Pt has been easily redirected.  Overall, there has been no management issues. at this time, there are no grounds to pursue involuntary psych admission for the pt. she was offered voluntary admission but she refused. hence, if Pt is medically cleared, she is also psychiatrically cleared for discharge where she may continue to be seen by psych providers of choice (including psychiatrist and therapist).  If Pt ends up being a medical admission, then Psych CL team to be consulted.    RECOMMENDATIONS:   1. Psychoeducation provided.  Encouraged follow up with OP psych services.  No indication for emergent psych meds at this time. Discussed role of ANTI-depressant as well as psychotherapy.  Pt expressed that she is open to this once she is able to establish an out Pt psychiatry clinic follow up.  lastly, discussed impact of illicit substances on her health and well being as well as the importance of sobriety.     2. Emergency protocol reviewed.  Pt and  were both adviced to call 911 or come to the nearest ED should symptoms worsen; have increasing bouts of agitation/aggressive behavior; having SI/HI.    2. Emergency protocol reviewed.  Pt and brother were adviced to call 911 or come to the nearest ED should symptoms worsen; have increasing bouts of agitation/aggressive behavior; having SI/HI; or call [phone] [place] [address]    3. given resources to the community like [facility] walk in Crisis centre, [facility] clinic, other OP psych clinics within Pt's community like [facility]  4. neuro consult/ evaluation 35/F with self reported hx of depression and anxiety; Per Psyckes: with multiple diagnoses of MDD, unspecified anxiety disorder, other psychoactive substance related disorder and opioid related disorder. Has remote psych admission during her teen age yrs following an attempted OD on pills (reportedly at age 17);  denied engaging in any self injurious behaviors.  Today, presented to the ED BIB EMS (from home) for a medical and psychiatric evaluation.  Pt claimed she has been feeling depressed. whereas mother reported that the Pt has not been eating well with resultant weight loss.    at this time, reports experiencing symptoms of depression and anxiety of which are supposedly precipitated and perpetuated by multiple psychosocial stressors. of concern, is her decreased oral intake with resultant wt loss. Pt has no active/ passive SI/HI.   There are no signs/symptoms of acute yaz or florid psychosis.  Pt is not showing any signs/symptoms of intoxication or withdrawal.  she is not delirious.  Pt has not tested limits.. Has maintained appropriate boundaries. Pt has been easily redirected.  Overall, there has been no management issues. at this time, there are no grounds to pursue involuntary psych admission for the pt. she was offered voluntary admission but she refused. hence, if Pt is medically cleared, she is also psychiatrically cleared for discharge where she may continue to be seen by psych providers of choice (including psychiatrist and therapist).  If Pt ends up being a medical admission, then Psych CL team to be consulted.    RECOMMENDATIONS:   1. Psychoeducation provided.  Encouraged follow up with OP psych services.  No indication for emergent psych meds at this time. Discussed role of ANTI-depressant as well as psychotherapy.  Pt expressed that she is open to this once she is able to establish an out Pt psychiatry clinic follow up.  lastly, discussed impact of illicit substances on her health and well being as well as the importance of sobriety.     2. Emergency protocol reviewed.  Pt and  were both adviced to call 911 or come to the nearest ED should symptoms worsen; have increasing bouts of agitation/aggressive behavior; having SI/HI.    2. Emergency protocol reviewed.  Pt and brother were adviced to call 911 or come to the nearest ED should symptoms worsen; have increasing bouts of agitation/aggressive behavior; having SI/HI; or call [phone] [place] [address]    3. given resources to the community like [facility] walk in Crisis centre, [facility] clinic, other OP psych clinics within Pt's community like [facility]  4. neuro consult/ evaluation    PROGRESS: AT 7AM, I spoke with ED resident handling this case. upon re-evaluation (and discussion with the Pt's mother) of this case, main service will pursue admitting this pt to medicine for FTT.

## 2021-11-17 NOTE — ED BEHAVIORAL HEALTH ASSESSMENT NOTE - OTHER
I STOP Reference #: [id number] - no recent controlled substances prescribed Brother, mother Brother superficially cooperative, irritable, entitled currently living with brother and brother's family for > 1 yr now. is unemployed with resultant financial hardship easily distracted gaunt looking, appeared older than stated age. in casual, appropriate clothing by hx: impaired

## 2021-11-17 NOTE — H&P ADULT - PROBLEM SELECTOR PLAN 2
Psych consult requested  No indication for emergent psych meds at this time  Admit to telemetry  Normal diet  Fall risk precautions Denies SI now  Psych recs: atarax PRN for anxiety, no psych contraindications for DC  regular diet  Fall risk precautions

## 2021-11-17 NOTE — BH CONSULTATION LIAISON PROGRESS NOTE - CASE SUMMARY
Chart reviewed. pt seen with trainee. Presents as pleasant though guarded re: psychiatric status/health, and seems to have various opinions about what is best for her (supplements, etc). She is not interested in formal psychiatric intervention. Seems to have mediocre insight, but nothing damning that can force psychiatric care involuntarily. Emphasis should be on medical w/u to see if anything acute is present, and if not, transfer to outpatient model of care (PCP, GI, nutrition, etc). Will continue to follow but there are no psych contraindications to discharge when medically cleared.

## 2021-11-17 NOTE — ED BEHAVIORAL HEALTH ASSESSMENT NOTE - NSBHROSSYSTEMS_PSY_ALL_CORE
otherwise, she denied any headaches, no dizziness, no blurring of vision; no sorethroat; no cough, no fever. no chest pains, no SOB, no palpitations, no abdominal pains, no nausea/ vomiting/ diarrhea, no dysuria, no hesitancy, no arthralgia/ no pruritus/Constitutional Symptoms.../Musculoskeletal...

## 2021-11-17 NOTE — H&P ADULT - NEGATIVE GENERAL SYMPTOMS
no fever/no chills/no sweating/no weight loss/no weight gain no fever/no chills/no sweating/no weight loss/no weight gain/no malaise

## 2021-11-17 NOTE — H&P ADULT - PROBLEM SELECTOR PLAN 5
Low VT risk, no pharmacological DVT prophylaxis required   Fall risk precautions Patient currently asymptomatic  Begin Ceftriaxone

## 2021-11-17 NOTE — H&P ADULT - NEGATIVE NEUROLOGICAL SYMPTOMS
no weakness/no syncope/no vertigo/no loss of sensation/no headache/no loss of consciousness/no confusion

## 2021-11-17 NOTE — ED BEHAVIORAL HEALTH ASSESSMENT NOTE - DIFFERENTIAL
unspecified depressive disorder unspecified depressive disorder  rule out cluster B personality disorder  rule out substance induced mood disorder adjustment disorder with depressed mood   unspecified depressive disorder  rule out cluster B personality disorder  rule out substance induced mood disorder

## 2021-11-17 NOTE — ED ADULT NURSE REASSESSMENT NOTE - NS ED NURSE REASSESS COMMENT FT1
Patient in bed post xray. Patient is a&ox4, given cereal and juice kosher food for patient. Denies chest pain, n/v. patient is very upset and abusive language towards RN. Patient updated on plan of care.

## 2021-11-17 NOTE — H&P ADULT - NEUROLOGICAL DETAILS
alert and oriented x 3/responds to verbal commands/normal strength alert and oriented x 3/responds to verbal commands/cranial nerves intact/normal strength

## 2021-11-17 NOTE — ED BEHAVIORAL HEALTH ASSESSMENT NOTE - OTHER PAST PSYCHIATRIC HISTORY (INCLUDE DETAILS REGARDING ONSET, COURSE OF ILLNESS, INPATIENT/OUTPATIENT TREATMENT)
one remote hospitalization in 2013 or 2014  suicide attempt via OD on pills  history of self injury as a teen self reported hx of depression and anxiety\  had one remote hospitalization in 2013 or 2014  suicide attempt via OD on pills  hx of self injury as a teen ager   no current psychiatrist/ therapist

## 2021-11-18 LAB
ANION GAP SERPL CALC-SCNC: 10 MMOL/L — SIGNIFICANT CHANGE UP (ref 7–14)
BASOPHILS # BLD AUTO: 0.04 K/UL — SIGNIFICANT CHANGE UP (ref 0–0.2)
BASOPHILS NFR BLD AUTO: 0.6 % — SIGNIFICANT CHANGE UP (ref 0–2)
BUN SERPL-MCNC: 25 MG/DL — HIGH (ref 7–23)
CALCIUM SERPL-MCNC: 9.3 MG/DL — SIGNIFICANT CHANGE UP (ref 8.4–10.5)
CHLORIDE SERPL-SCNC: 107 MMOL/L — SIGNIFICANT CHANGE UP (ref 98–107)
CO2 SERPL-SCNC: 24 MMOL/L — SIGNIFICANT CHANGE UP (ref 22–31)
COVID-19 NUCLEOCAPSID GAM AB INTERP: NEGATIVE — SIGNIFICANT CHANGE UP
COVID-19 NUCLEOCAPSID TOTAL GAM ANTIBODY RESULT: 0.08 INDEX — SIGNIFICANT CHANGE UP
COVID-19 SPIKE DOMAIN AB INTERP: NEGATIVE — SIGNIFICANT CHANGE UP
COVID-19 SPIKE DOMAIN ANTIBODY RESULT: 0.4 U/ML — SIGNIFICANT CHANGE UP
CREAT SERPL-MCNC: 0.75 MG/DL — SIGNIFICANT CHANGE UP (ref 0.5–1.3)
EOSINOPHIL # BLD AUTO: 0.26 K/UL — SIGNIFICANT CHANGE UP (ref 0–0.5)
EOSINOPHIL NFR BLD AUTO: 4 % — SIGNIFICANT CHANGE UP (ref 0–6)
GLUCOSE SERPL-MCNC: 90 MG/DL — SIGNIFICANT CHANGE UP (ref 70–99)
HCT VFR BLD CALC: 36.7 % — SIGNIFICANT CHANGE UP (ref 34.5–45)
HGB BLD-MCNC: 11.4 G/DL — LOW (ref 11.5–15.5)
HIV 1+2 AB+HIV1 P24 AG SERPL QL IA: SIGNIFICANT CHANGE UP
IANC: 2.94 K/UL — SIGNIFICANT CHANGE UP (ref 1.5–8.5)
IMM GRANULOCYTES NFR BLD AUTO: 0.2 % — SIGNIFICANT CHANGE UP (ref 0–1.5)
LYMPHOCYTES # BLD AUTO: 2.81 K/UL — SIGNIFICANT CHANGE UP (ref 1–3.3)
LYMPHOCYTES # BLD AUTO: 42.8 % — SIGNIFICANT CHANGE UP (ref 13–44)
MCHC RBC-ENTMCNC: 22.1 PG — LOW (ref 27–34)
MCHC RBC-ENTMCNC: 31.1 GM/DL — LOW (ref 32–36)
MCV RBC AUTO: 71.1 FL — LOW (ref 80–100)
MONOCYTES # BLD AUTO: 0.5 K/UL — SIGNIFICANT CHANGE UP (ref 0–0.9)
MONOCYTES NFR BLD AUTO: 7.6 % — SIGNIFICANT CHANGE UP (ref 2–14)
NEUTROPHILS # BLD AUTO: 2.94 K/UL — SIGNIFICANT CHANGE UP (ref 1.8–7.4)
NEUTROPHILS NFR BLD AUTO: 44.8 % — SIGNIFICANT CHANGE UP (ref 43–77)
NRBC # BLD: 0 /100 WBCS — SIGNIFICANT CHANGE UP
NRBC # FLD: 0 K/UL — SIGNIFICANT CHANGE UP
PLATELET # BLD AUTO: 298 K/UL — SIGNIFICANT CHANGE UP (ref 150–400)
POTASSIUM SERPL-MCNC: 4.4 MMOL/L — SIGNIFICANT CHANGE UP (ref 3.5–5.3)
POTASSIUM SERPL-SCNC: 4.4 MMOL/L — SIGNIFICANT CHANGE UP (ref 3.5–5.3)
RBC # BLD: 5.16 M/UL — SIGNIFICANT CHANGE UP (ref 3.8–5.2)
RBC # FLD: 15.2 % — HIGH (ref 10.3–14.5)
SARS-COV-2 IGG+IGM SERPL QL IA: 0.08 INDEX — SIGNIFICANT CHANGE UP
SARS-COV-2 IGG+IGM SERPL QL IA: 0.4 U/ML — SIGNIFICANT CHANGE UP
SARS-COV-2 IGG+IGM SERPL QL IA: NEGATIVE — SIGNIFICANT CHANGE UP
SARS-COV-2 IGG+IGM SERPL QL IA: NEGATIVE — SIGNIFICANT CHANGE UP
SODIUM SERPL-SCNC: 141 MMOL/L — SIGNIFICANT CHANGE UP (ref 135–145)
WBC # BLD: 6.56 K/UL — SIGNIFICANT CHANGE UP (ref 3.8–10.5)
WBC # FLD AUTO: 6.56 K/UL — SIGNIFICANT CHANGE UP (ref 3.8–10.5)

## 2021-11-18 PROCEDURE — 99233 SBSQ HOSP IP/OBS HIGH 50: CPT

## 2021-11-18 PROCEDURE — 71250 CT THORAX DX C-: CPT | Mod: 26

## 2021-11-18 RX ADMIN — CEFTRIAXONE 100 MILLIGRAM(S): 500 INJECTION, POWDER, FOR SOLUTION INTRAMUSCULAR; INTRAVENOUS at 11:52

## 2021-11-18 RX ADMIN — Medication 3 MILLIGRAM(S): at 23:42

## 2021-11-18 RX ADMIN — Medication 1 PATCH: at 11:52

## 2021-11-18 RX ADMIN — Medication 1 PATCH: at 06:20

## 2021-11-18 NOTE — DISCHARGE NOTE PROVIDER - HOSPITAL COURSE
36 y/o female, past medical history of depression, presented with decreased appetite Admitted to medicine for failure to thrive. Patient was seen by psychiatry for her history of depression in setting of failure to thrive and recent weight loss.  Patient was deemed to not be a threat to herself and declined the use of psychiatric medications.  Urinalysis was positive and she was treated with antibiotics.  Imaging notable for an 8mm lung nodule for which CT was performed__________      Information was given to patient for psychiatric follow up at [facility] as an outpatient, currently declining referral.    Patient was evaluated by physical therapy and determined     On ___ patient medically cleared for discharge home by  ______ 36 y/o female, past medical history of depression, presented with decreased appetite Admitted to medicine for failure to thrive. Patient was seen by psychiatry for her history of depression in setting of failure to thrive and recent weight loss.  Patient was deemed to not be a threat to herself and declined the use of psychiatric medications.  Urinalysis was positive and she was treated with antibiotics.  Imaging notable for an 8mm lung nodule for which CT was performed 11/18/2021    Information was given to patient for psychiatric follow up at [facility] as an outpatient, currently declining referral.    Patient was evaluated by physical therapy and determined home PT     On 11/19/2021 patient medically cleared for discharge home by Dr. Masoud Veliz 34 y/o female, past medical history of depression, presented with decreased appetite Admitted to medicine for failure to thrive. Patient was seen by psychiatry for her history of depression in setting of failure to thrive and recent weight loss.  Patient was deemed to not be a threat to herself and declined the use of psychiatric medications.  Urinalysis was positive and she was treated with antibiotics.  Imaging notable for an 8mm lung nodule for which CT was performed 11/18/2021    Information was given to patient for psychiatric follow up at [facility] as an outpatient, currently declining referral.    Patient was evaluated by physical therapy and determined home PT     On 11/23/2021 patient medically cleared for discharge home by Dr. Jose A Rodgers

## 2021-11-18 NOTE — DISCHARGE NOTE PROVIDER - NSDCHHNEEDSERVICE_GEN_ALL_CORE
Rehabilitation services/Teaching and training Medication teaching and assessment/Rehabilitation services/Teaching and training

## 2021-11-18 NOTE — DISCHARGE NOTE PROVIDER - DETAILS OF MALNUTRITION DIAGNOSIS/DIAGNOSES
This patient has been assessed with a concern for Malnutrition and was treated during this hospitalization for the following Nutrition diagnosis/diagnoses:     -  11/19/2021: Severe protein-calorie malnutrition   -  11/19/2021: Underweight (BMI < 19)

## 2021-11-18 NOTE — DISCHARGE NOTE PROVIDER - NSDCCPCAREPLAN_GEN_ALL_CORE_FT
PRINCIPAL DISCHARGE DIAGNOSIS  Diagnosis: FTT (failure to thrive) in adult  Assessment and Plan of Treatment: You were admitted to the hospitals for concerns of weight loss and decreased appetite.      SECONDARY DISCHARGE DIAGNOSES  Diagnosis: Depressive disorder  Assessment and Plan of Treatment: Follow up with your primary care provider.  You can also follow with Outpatient Psychiatry [facility] Contact [phone].  If you have feelings of wanting to hurt yourself please call [phone].      Diagnosis: UTI (urinary tract infection)  Assessment and Plan of Treatment: You were started on antibiotics for a urinary infection. To help prevent future infections maintain healthy hygiene and avoid taking long baths. Wipe from front to back and empty your bladder frequently by keeping yourself properly hydrated. Monitor for signs/symptoms of infection, such as, fever/chills, burning/pain with urination, urinary frequency/hesitancy, cloudy urine, or blood in urine and follow-up with your primary care provider as outpatient for further care.  Complete your entire course of antibiotics even if you feel better       PRINCIPAL DISCHARGE DIAGNOSIS  Diagnosis: FTT (failure to thrive) in adult  Assessment and Plan of Treatment: You were admitted to the hospitals for concerns of weight loss and decreased appetite.      SECONDARY DISCHARGE DIAGNOSES  Diagnosis: UTI (urinary tract infection)  Assessment and Plan of Treatment: You were started on antibiotics for a urinary infection. To help prevent future infections maintain healthy hygiene and avoid taking long baths. Wipe from front to back and empty your bladder frequently by keeping yourself properly hydrated. Monitor for signs/symptoms of infection, such as, fever/chills, burning/pain with urination, urinary frequency/hesitancy, cloudy urine, or blood in urine and follow-up with your primary care provider as outpatient for further care.  Complete your entire course of antibiotics even if you feel better      Diagnosis: Unsteady gait  Assessment and Plan of Treatment: You were seen for unsteady gait. Maintain a safe environment. Do not change positions quickly. Particpate in program recommended by physical therapy      Diagnosis: Depressive disorder  Assessment and Plan of Treatment: Follow up with your primary care provider.  You can also follow with Outpatient Psychiatry [facility] Contact [phone].  If you have feelings of wanting to hurt yourself please call [phone].       PRINCIPAL DISCHARGE DIAGNOSIS  Diagnosis: FTT (failure to thrive) in adult  Assessment and Plan of Treatment: You were admitted to the hospitals for concerns of weight loss and decreased appetite.      SECONDARY DISCHARGE DIAGNOSES  Diagnosis: UTI (urinary tract infection)  Assessment and Plan of Treatment: You were completed a course of antibiotics for a urinary infection. To help prevent future infections maintain healthy hygiene and avoid taking long baths. Wipe from front to back and empty your bladder frequently by keeping yourself properly hydrated. Monitor for signs/symptoms of infection, such as, fever/chills, burning/pain with urination, urinary frequency/hesitancy, cloudy urine, or blood in urine and follow-up with your primary care provider as outpatient for further care.      Diagnosis: Depressive disorder  Assessment and Plan of Treatment: Follow up with your primary care provider.  You can also follow with Outpatient Psychiatry [facility] Contact [phone].  If you have feelings of wanting to hurt yourself please call 911.      Diagnosis: Unsteady gait  Assessment and Plan of Treatment: You were seen for unsteady gait. Maintain a safe environment. Do not change positions quickly. Particpate in program recommended by physical therapy

## 2021-11-18 NOTE — CHART NOTE - NSCHARTNOTEFT_GEN_A_CORE
Notified by radiology of abnormal chest x-ray finding of 8mm nodule.  Per earlier discussion with patient she has a history of a "benign lung nodule, years ago" for which she was supposed to follow up for repeat imaging however never did.  CT chest non-contrast ordered for further evaluation. Notified by radiology of abnormal chest x-ray finding of 8mm nodule.  Per earlier discussion with patient she has a history of a "benign lung nodule, years ago" for which she was supposed to follow up for repeat imaging however never did.  CT chest non-contrast ordered for further evaluation.  Discussed w/attending.

## 2021-11-18 NOTE — DISCHARGE NOTE PROVIDER - PROVIDER TOKENS
FREE:[LAST:[Primary Care Provider],PHONE:[(   )    -],FAX:[(   )    -],FOLLOWUP:[1 week],ESTABLISHEDPATIENT:[T]]

## 2021-11-18 NOTE — DISCHARGE NOTE PROVIDER - CARE PROVIDER_API CALL
Primary Care Provider,   Phone: (   )    -  Fax: (   )    -  Established Patient  Follow Up Time: 1 week

## 2021-11-19 LAB
ALBUMIN SERPL ELPH-MCNC: 4.2 G/DL — SIGNIFICANT CHANGE UP (ref 3.3–5)
ALP SERPL-CCNC: 108 U/L — SIGNIFICANT CHANGE UP (ref 40–120)
ALT FLD-CCNC: 12 U/L — SIGNIFICANT CHANGE UP (ref 4–33)
ANION GAP SERPL CALC-SCNC: 10 MMOL/L — SIGNIFICANT CHANGE UP (ref 7–14)
AST SERPL-CCNC: 13 U/L — SIGNIFICANT CHANGE UP (ref 4–32)
BILIRUB SERPL-MCNC: 0.3 MG/DL — SIGNIFICANT CHANGE UP (ref 0.2–1.2)
BUN SERPL-MCNC: 22 MG/DL — SIGNIFICANT CHANGE UP (ref 7–23)
CALCIUM SERPL-MCNC: 9.3 MG/DL — SIGNIFICANT CHANGE UP (ref 8.4–10.5)
CHLORIDE SERPL-SCNC: 108 MMOL/L — HIGH (ref 98–107)
CO2 SERPL-SCNC: 24 MMOL/L — SIGNIFICANT CHANGE UP (ref 22–31)
CREAT SERPL-MCNC: 0.68 MG/DL — SIGNIFICANT CHANGE UP (ref 0.5–1.3)
GLUCOSE SERPL-MCNC: 85 MG/DL — SIGNIFICANT CHANGE UP (ref 70–99)
HCT VFR BLD CALC: 33.9 % — LOW (ref 34.5–45)
HGB BLD-MCNC: 11.1 G/DL — LOW (ref 11.5–15.5)
MAGNESIUM SERPL-MCNC: 2.1 MG/DL — SIGNIFICANT CHANGE UP (ref 1.6–2.6)
MCHC RBC-ENTMCNC: 22.7 PG — LOW (ref 27–34)
MCHC RBC-ENTMCNC: 32.7 GM/DL — SIGNIFICANT CHANGE UP (ref 32–36)
MCV RBC AUTO: 69.3 FL — LOW (ref 80–100)
NRBC # BLD: 0 /100 WBCS — SIGNIFICANT CHANGE UP
NRBC # FLD: 0 K/UL — SIGNIFICANT CHANGE UP
PHOSPHATE SERPL-MCNC: 4 MG/DL — SIGNIFICANT CHANGE UP (ref 2.5–4.5)
PLATELET # BLD AUTO: 246 K/UL — SIGNIFICANT CHANGE UP (ref 150–400)
POTASSIUM SERPL-MCNC: 4.4 MMOL/L — SIGNIFICANT CHANGE UP (ref 3.5–5.3)
POTASSIUM SERPL-SCNC: 4.4 MMOL/L — SIGNIFICANT CHANGE UP (ref 3.5–5.3)
PROT SERPL-MCNC: 6.5 G/DL — SIGNIFICANT CHANGE UP (ref 6–8.3)
RBC # BLD: 4.89 M/UL — SIGNIFICANT CHANGE UP (ref 3.8–5.2)
RBC # FLD: 15.1 % — HIGH (ref 10.3–14.5)
SODIUM SERPL-SCNC: 142 MMOL/L — SIGNIFICANT CHANGE UP (ref 135–145)
T PALLIDUM AB TITR SER: NEGATIVE — SIGNIFICANT CHANGE UP
WBC # BLD: 9.24 K/UL — SIGNIFICANT CHANGE UP (ref 3.8–10.5)
WBC # FLD AUTO: 9.24 K/UL — SIGNIFICANT CHANGE UP (ref 3.8–10.5)

## 2021-11-19 RX ORDER — LANOLIN ALCOHOL/MO/W.PET/CERES
3 CREAM (GRAM) TOPICAL ONCE
Refills: 0 | Status: COMPLETED | OUTPATIENT
Start: 2021-11-19 | End: 2021-11-19

## 2021-11-19 RX ADMIN — Medication 3 MILLIGRAM(S): at 21:31

## 2021-11-19 RX ADMIN — Medication 1 PATCH: at 07:00

## 2021-11-19 RX ADMIN — Medication 1 PATCH: at 11:52

## 2021-11-19 RX ADMIN — CEFTRIAXONE 100 MILLIGRAM(S): 500 INJECTION, POWDER, FOR SOLUTION INTRAMUSCULAR; INTRAVENOUS at 12:16

## 2021-11-19 RX ADMIN — Medication 3 MILLIGRAM(S): at 23:37

## 2021-11-19 NOTE — CHART NOTE - NSCHARTNOTEFT_GEN_A_CORE
Emergency Department Note- Mynor Holland 26 y o  female MRN: [id number]    Unit/Bed#: ED 11 Encounter: [id number]        History of Present Illness   HPI:  Mynor Holland is a 26 y o  female who presents with multiple complaints  Patient states she has a birth control implant in her right arm and 2 weeks ago she was lifting something when she noticed it move and then has been having pain in that area since  Patient then 1 week ago started having nausea chills breast swelling and pain abdominal cramping  Patient 4 days ago then started with vaginal bleeding  Patient has not had a period in 1 year due to her implant  Patient with no back pain no chest pain or shortness of breath  Patient subjectively feels feverish  Patient currently does not have insurance and is unable to follow up with OBGYN  Patient does not believe she is pregnant      REVIEW OF SYSTEMS    Constitutional: Negative for chills, positive fatigue and fever  HENT: Negative for ear pain, sore throat and trouble swallowing  Eyes: Negative for photophobia, pain and visual disturbance  Respiratory: Negative for cough, chest tightness and shortness of breath  Cardiovascular: Negative for chest pain and palpitations  Gastrointestinal:  Positive for abdominal pain, negative constipation, diarrhea, positive nausea and vomiting  Genitourinary: Negative for dysuria, flank pain, frequency and hematuria  Musculoskeletal: Negative for back pain and neck pain  Skin: Negative for color change and rash  Neurological: Negative for dizziness, weakness, light-headedness and headaches  Psychiatric/Behavioral: Negative for confusion  The patient is not nervous/anxious  All systems reviewed and negative except as noted above or in HPI         Historical Information   History reviewed  No pertinent past medical history  History reviewed  No pertinent surgical history    Social History   History   Alcohol Use No     History   Drug Use No History   Smoking Status    Never Smoker   Smokeless Tobacco    Never Used     Family History: History reviewed  No pertinent family history  Meds/Allergies     (Not in a hospital admission)  No Known Allergies    Objective   Vitals: Blood pressure 120/72, pulse 77, temperature 98 6 °F (37 °C), temperature source Oral, resp  rate 18, height 5' 3" (1 6 m), weight 52 2 kg (115 lb), SpO2 100 %  PHYSICAL EXAM     General Appearance: alert and oriented, nad, non toxic appearing  Skin:  Warm, dry, intact  HEENT: atraumatic, normocephalic, eomi, perll   Neck: Supple, no JVD, no lymphadenopathy, trachea midline, no bruit  Cardiac: rrr, no murmurs, rub, gallops  Pulmonary: lungs cta, no wheezes, rales, rhonchi  Gastrointestinal: abdomen soft nontender, good bs, no mass or bruits, no cva tenderness  Extremities: no pedal edema, good pulses, no calf tenderness, no clubbing, no cyanosis right upper extremity with implant noted mild tenderness to palpation  Neuro:  no focal motor or sensory deficits, cn intact  Psych:  Normal mood and affect, normal judgement and insight      Lab Results: Lab Results: I have personally reviewed pertinent lab results  Assessment/Plan     ED Medical Decision Making:  Check Chem i-STAT and urine preg and urine dip  Will discuss with OB gyn regarding implant  Patient given Zofran for nausea    Portions of the record may have been created with voice recognition software  Occasional wrong word or "sound a like" substitutions may have occurred due to the inherent limitations of voice recognition software  Read the chart carefully and recognize, using context, where substitutions have occurred  Ty Matamoros MD  05/24/18 1048    Discussed with gyn they will see patient in follow-up  Patient reassured that her vaginal bleeding is secondary to side effects of her birth control implants         Ty Matamoros MD  05/24/18 4162 NUTRITION Note:    Pt seen as per consult for Calorie count and Failure to thrive.  Pt AOx4 and reports that she does not have eating disorder.  Pt skipped meals pta due to her living conditions with her family but eats good when able to manage.  Pt ate 100% of her Kosher breakfast today and drinks supplement Ensure Enlive 2x daily (700 steffanie and 40 gm protein), suggest increase to 3 servings daily  (1050 steffanie and 60 gm protein) . Pt appears cachectic with BMI-15.2.  Pt reports her peak wt to be 90# 3 months ago.  Wt. loss of 8.8% in past 3 months.  Pt also reports to take prenatal vitamins with B complex. Pt is trying to get settled in some facility where she can eat proper meals.  pt likely scheduled to be d/c today.  As per dept policy, calorie count is conducted and PO intake can be recorded in nursing flow sheet if d /c plan changes.      Weight: 82# (11/17/21), UBW- 90# 3months ago.     Labs:  11-19 Na 142 mmol/L Glu 85 mg/dL K+ 4.4 mmol/L Cr 0.68 mg/dL BUN 22 mg/dL Phos 4.0 mg/dL      Current Diet: Diet, Regular: Kosher  Supplement Feeding Modality:  Oral  Ensure Enlive Cans or Servings Per Day:  1       Frequency:  Two Times a day (11-17-21 @ 13:29)    Skin- intact with no edema as per RN flow sheet    Nutrition focused physical exam conducted - found signs of malnutrition [ ]absent [x ]present   Subcutaneous fat loss: [ ] Orbital fat pads region, [ ]Buccal fat region, [ x]Triceps region,  [ ]Ribs region  Muscle wasting: [ x]Temples region, [ x]Clavicle region,   [ x]Shoulder region, [ ]Scapula region, [ ]Interosseous region,  [ ]thigh region, [ ]Calf region     Nutrition Diagnosis:    Pt meets criteria for severe protein calorie malnutrition   related to her social environment- skipping meals  as evidenced by moderate to severe loss of muscle and fat loss, 8.8% wt. loss in 3 months.     Goal/Expected Outcome-  Pt will consume >75% meals per day.     RD to Remain Available:    Additional Recommendations:   Suggest increase supplement to Ensure Enlive 3x daily (1050 steffanie and 60 gm protein)   Monitor PO intake, weight trends, nutrition related lab values, BMs/GI distress, hydration status, skin integrity.       Deborah Rodríguez RDN #[id number] NUTRITION Note:    Pt seen as per consult for Calorie count and Failure to thrive.  Pt AOx4 and reports that she does not have eating disorder.  Pt skipped meals pta due to her living conditions with her family but eats good when able to manage.  Pt ate 100% of her Kosher breakfast today and drinks supplement Ensure Enlive 2x daily (700 steffanie and 40 gm protein), suggest increase to 3 servings daily  (1050 steffanie and 60 gm protein) . Pt appears cachectic with BMI-15.2.  Pt reports her peak wt to be 90# 3 months ago.  Wt. loss of 8.8% in past 3 months.  Pt also reports to take prenatal vitamins with B complex. Pt is trying to get settled in some facility where she can eat proper meals.  pt likely scheduled to be d/c today.  As per dept policy, calorie count is conducted and PO intake can be recorded in nursing flow sheet if d /c plan changes.      Weight: 82# (11/17/21), UBW- 90# 3months ago.     Labs:  11-19 Na 142 mmol/L Glu 85 mg/dL K+ 4.4 mmol/L Cr 0.68 mg/dL BUN 22 mg/dL Phos 4.0 mg/dL      Current Diet: Diet, Regular: Kosher  Supplement Feeding Modality:  Oral  Ensure Enlive Cans or Servings Per Day:  1       Frequency:  Two Times a day (11-17-21 @ 13:29)    Skin- intact with no edema as per RN flow sheet    Nutrition focused physical exam conducted - found signs of malnutrition [ ]absent [x ]present   Subcutaneous fat loss: [ ] Orbital fat pads region, [ ]Buccal fat region, [ x]Triceps region,  [ ]Ribs region  Muscle wasting: [ x]Temples region, [ x]Clavicle region,   [ x]Shoulder region, [ ]Scapula region, [ ]Interosseous region,  [ ]thigh region, [ ]Calf region     Nutrition Diagnosis:    Pt meets criteria for severe protein calorie malnutrition   related to her social environment- skipping meals  as evidenced by moderate to severe loss of muscle and fat loss, 8.8% wt. loss in 3 months.     Goal/Expected Outcome-  Pt will consume >75% meals per day.     Estimated Needs:  9536-2367 kcal (35-40 kcal/kg) & 44- 52 gm protein (1.2-1.4gm/kg) Using actual wt- 82# (11/17/21)    RD to Remain Available:    Additional Recommendations:   Suggest increase supplement to Ensure Enlive 3x daily (1050 steffanie and 60 gm protein)   Monitor PO intake, weight trends, nutrition related lab values, BMs/GI distress, hydration status, skin integrity.       Deborah Rodríguez RDN #[id number]

## 2021-11-19 NOTE — DIETITIAN NUTRITION RISK NOTIFICATION - TREATMENT: THE FOLLOWING DIET HAS BEEN RECOMMENDED
Diet, Regular:   Kosher  Supplement Feeding Modality:  Oral  Ensure Enlive Cans or Servings Per Day:  1       Frequency:  Two Times a day (11-17-21 @ 13:29) [Active]

## 2021-11-20 LAB
ALBUMIN SERPL ELPH-MCNC: 3.8 G/DL — SIGNIFICANT CHANGE UP (ref 3.3–5)
ALP SERPL-CCNC: 100 U/L — SIGNIFICANT CHANGE UP (ref 40–120)
ALT FLD-CCNC: 12 U/L — SIGNIFICANT CHANGE UP (ref 4–33)
ANION GAP SERPL CALC-SCNC: 9 MMOL/L — SIGNIFICANT CHANGE UP (ref 7–14)
AST SERPL-CCNC: 13 U/L — SIGNIFICANT CHANGE UP (ref 4–32)
BILIRUB SERPL-MCNC: 0.4 MG/DL — SIGNIFICANT CHANGE UP (ref 0.2–1.2)
BUN SERPL-MCNC: 21 MG/DL — SIGNIFICANT CHANGE UP (ref 7–23)
CALCIUM SERPL-MCNC: 8.8 MG/DL — SIGNIFICANT CHANGE UP (ref 8.4–10.5)
CHLORIDE SERPL-SCNC: 109 MMOL/L — HIGH (ref 98–107)
CO2 SERPL-SCNC: 25 MMOL/L — SIGNIFICANT CHANGE UP (ref 22–31)
CREAT SERPL-MCNC: 0.62 MG/DL — SIGNIFICANT CHANGE UP (ref 0.5–1.3)
GLUCOSE SERPL-MCNC: 88 MG/DL — SIGNIFICANT CHANGE UP (ref 70–99)
HCT VFR BLD CALC: 32.1 % — LOW (ref 34.5–45)
HGB BLD-MCNC: 9.8 G/DL — LOW (ref 11.5–15.5)
MAGNESIUM SERPL-MCNC: 2 MG/DL — SIGNIFICANT CHANGE UP (ref 1.6–2.6)
MCHC RBC-ENTMCNC: 21.8 PG — LOW (ref 27–34)
MCHC RBC-ENTMCNC: 30.5 GM/DL — LOW (ref 32–36)
MCV RBC AUTO: 71.5 FL — LOW (ref 80–100)
NRBC # BLD: 0 /100 WBCS — SIGNIFICANT CHANGE UP
NRBC # FLD: 0 K/UL — SIGNIFICANT CHANGE UP
PHOSPHATE SERPL-MCNC: 3.3 MG/DL — SIGNIFICANT CHANGE UP (ref 2.5–4.5)
PLATELET # BLD AUTO: 247 K/UL — SIGNIFICANT CHANGE UP (ref 150–400)
POTASSIUM SERPL-MCNC: 4.1 MMOL/L — SIGNIFICANT CHANGE UP (ref 3.5–5.3)
POTASSIUM SERPL-SCNC: 4.1 MMOL/L — SIGNIFICANT CHANGE UP (ref 3.5–5.3)
PROT SERPL-MCNC: 6.2 G/DL — SIGNIFICANT CHANGE UP (ref 6–8.3)
RBC # BLD: 4.49 M/UL — SIGNIFICANT CHANGE UP (ref 3.8–5.2)
RBC # FLD: 15.1 % — HIGH (ref 10.3–14.5)
SODIUM SERPL-SCNC: 143 MMOL/L — SIGNIFICANT CHANGE UP (ref 135–145)
WBC # BLD: 6.85 K/UL — SIGNIFICANT CHANGE UP (ref 3.8–10.5)
WBC # FLD AUTO: 6.85 K/UL — SIGNIFICANT CHANGE UP (ref 3.8–10.5)

## 2021-11-20 RX ADMIN — Medication 1 PATCH: at 11:18

## 2021-11-20 RX ADMIN — Medication 1 PATCH: at 19:05

## 2021-11-20 RX ADMIN — Medication 3 MILLIGRAM(S): at 21:00

## 2021-11-21 LAB
ALBUMIN SERPL ELPH-MCNC: 4.1 G/DL — SIGNIFICANT CHANGE UP (ref 3.3–5)
ALP SERPL-CCNC: 105 U/L — SIGNIFICANT CHANGE UP (ref 40–120)
ALT FLD-CCNC: 13 U/L — SIGNIFICANT CHANGE UP (ref 4–33)
ANION GAP SERPL CALC-SCNC: 7 MMOL/L — SIGNIFICANT CHANGE UP (ref 7–14)
AST SERPL-CCNC: 16 U/L — SIGNIFICANT CHANGE UP (ref 4–32)
BILIRUB SERPL-MCNC: 0.5 MG/DL — SIGNIFICANT CHANGE UP (ref 0.2–1.2)
BUN SERPL-MCNC: 20 MG/DL — SIGNIFICANT CHANGE UP (ref 7–23)
CALCIUM SERPL-MCNC: 9.4 MG/DL — SIGNIFICANT CHANGE UP (ref 8.4–10.5)
CHLORIDE SERPL-SCNC: 105 MMOL/L — SIGNIFICANT CHANGE UP (ref 98–107)
CO2 SERPL-SCNC: 27 MMOL/L — SIGNIFICANT CHANGE UP (ref 22–31)
CREAT SERPL-MCNC: 0.64 MG/DL — SIGNIFICANT CHANGE UP (ref 0.5–1.3)
GLUCOSE SERPL-MCNC: 84 MG/DL — SIGNIFICANT CHANGE UP (ref 70–99)
HCT VFR BLD CALC: 33.2 % — LOW (ref 34.5–45)
HGB BLD-MCNC: 10.8 G/DL — LOW (ref 11.5–15.5)
MAGNESIUM SERPL-MCNC: 2 MG/DL — SIGNIFICANT CHANGE UP (ref 1.6–2.6)
MCHC RBC-ENTMCNC: 22.5 PG — LOW (ref 27–34)
MCHC RBC-ENTMCNC: 32.5 GM/DL — SIGNIFICANT CHANGE UP (ref 32–36)
MCV RBC AUTO: 69 FL — LOW (ref 80–100)
NRBC # BLD: 0 /100 WBCS — SIGNIFICANT CHANGE UP
NRBC # FLD: 0 K/UL — SIGNIFICANT CHANGE UP
PHOSPHATE SERPL-MCNC: 3.2 MG/DL — SIGNIFICANT CHANGE UP (ref 2.5–4.5)
PLATELET # BLD AUTO: 225 K/UL — SIGNIFICANT CHANGE UP (ref 150–400)
POTASSIUM SERPL-MCNC: 4 MMOL/L — SIGNIFICANT CHANGE UP (ref 3.5–5.3)
POTASSIUM SERPL-SCNC: 4 MMOL/L — SIGNIFICANT CHANGE UP (ref 3.5–5.3)
PROT SERPL-MCNC: 6.6 G/DL — SIGNIFICANT CHANGE UP (ref 6–8.3)
RBC # BLD: 4.81 M/UL — SIGNIFICANT CHANGE UP (ref 3.8–5.2)
RBC # FLD: 15.3 % — HIGH (ref 10.3–14.5)
SODIUM SERPL-SCNC: 139 MMOL/L — SIGNIFICANT CHANGE UP (ref 135–145)
WBC # BLD: 6.62 K/UL — SIGNIFICANT CHANGE UP (ref 3.8–10.5)
WBC # FLD AUTO: 6.62 K/UL — SIGNIFICANT CHANGE UP (ref 3.8–10.5)

## 2021-11-21 RX ORDER — LANOLIN ALCOHOL/MO/W.PET/CERES
6 CREAM (GRAM) TOPICAL ONCE
Refills: 0 | Status: COMPLETED | OUTPATIENT
Start: 2021-11-21 | End: 2021-11-21

## 2021-11-21 RX ORDER — LANOLIN ALCOHOL/MO/W.PET/CERES
9 CREAM (GRAM) TOPICAL AT BEDTIME
Refills: 0 | Status: DISCONTINUED | OUTPATIENT
Start: 2021-11-21 | End: 2021-11-25

## 2021-11-21 RX ADMIN — Medication 1 PATCH: at 08:38

## 2021-11-21 RX ADMIN — Medication 1 PATCH: at 11:17

## 2021-11-21 RX ADMIN — Medication 3 MILLIGRAM(S): at 21:14

## 2021-11-21 RX ADMIN — Medication 6 MILLIGRAM(S): at 03:51

## 2021-11-21 RX ADMIN — Medication 1 PATCH: at 11:15

## 2021-11-22 LAB
ALBUMIN SERPL ELPH-MCNC: 4.1 G/DL — SIGNIFICANT CHANGE UP (ref 3.3–5)
ALP SERPL-CCNC: 106 U/L — SIGNIFICANT CHANGE UP (ref 40–120)
ALT FLD-CCNC: 13 U/L — SIGNIFICANT CHANGE UP (ref 4–33)
ANION GAP SERPL CALC-SCNC: 7 MMOL/L — SIGNIFICANT CHANGE UP (ref 7–14)
AST SERPL-CCNC: 18 U/L — SIGNIFICANT CHANGE UP (ref 4–32)
BILIRUB SERPL-MCNC: 0.3 MG/DL — SIGNIFICANT CHANGE UP (ref 0.2–1.2)
BUN SERPL-MCNC: 24 MG/DL — HIGH (ref 7–23)
CALCIUM SERPL-MCNC: 9.4 MG/DL — SIGNIFICANT CHANGE UP (ref 8.4–10.5)
CHLORIDE SERPL-SCNC: 106 MMOL/L — SIGNIFICANT CHANGE UP (ref 98–107)
CO2 SERPL-SCNC: 26 MMOL/L — SIGNIFICANT CHANGE UP (ref 22–31)
CREAT SERPL-MCNC: 0.66 MG/DL — SIGNIFICANT CHANGE UP (ref 0.5–1.3)
GLUCOSE SERPL-MCNC: 85 MG/DL — SIGNIFICANT CHANGE UP (ref 70–99)
HCT VFR BLD CALC: 32.6 % — LOW (ref 34.5–45)
HGB BLD-MCNC: 10.5 G/DL — LOW (ref 11.5–15.5)
MAGNESIUM SERPL-MCNC: 2.1 MG/DL — SIGNIFICANT CHANGE UP (ref 1.6–2.6)
MCHC RBC-ENTMCNC: 22.2 PG — LOW (ref 27–34)
MCHC RBC-ENTMCNC: 32.2 GM/DL — SIGNIFICANT CHANGE UP (ref 32–36)
MCV RBC AUTO: 68.9 FL — LOW (ref 80–100)
NRBC # BLD: 0 /100 WBCS — SIGNIFICANT CHANGE UP
NRBC # FLD: 0 K/UL — SIGNIFICANT CHANGE UP
PHOSPHATE SERPL-MCNC: 3.5 MG/DL — SIGNIFICANT CHANGE UP (ref 2.5–4.5)
PLATELET # BLD AUTO: 224 K/UL — SIGNIFICANT CHANGE UP (ref 150–400)
POTASSIUM SERPL-MCNC: 4.1 MMOL/L — SIGNIFICANT CHANGE UP (ref 3.5–5.3)
POTASSIUM SERPL-SCNC: 4.1 MMOL/L — SIGNIFICANT CHANGE UP (ref 3.5–5.3)
PROT SERPL-MCNC: 6.7 G/DL — SIGNIFICANT CHANGE UP (ref 6–8.3)
RBC # BLD: 4.73 M/UL — SIGNIFICANT CHANGE UP (ref 3.8–5.2)
RBC # FLD: 15.1 % — HIGH (ref 10.3–14.5)
SODIUM SERPL-SCNC: 139 MMOL/L — SIGNIFICANT CHANGE UP (ref 135–145)
WBC # BLD: 6.12 K/UL — SIGNIFICANT CHANGE UP (ref 3.8–10.5)
WBC # FLD AUTO: 6.12 K/UL — SIGNIFICANT CHANGE UP (ref 3.8–10.5)

## 2021-11-22 RX ORDER — LANOLIN ALCOHOL/MO/W.PET/CERES
6 CREAM (GRAM) TOPICAL ONCE
Refills: 0 | Status: COMPLETED | OUTPATIENT
Start: 2021-11-22 | End: 2021-11-22

## 2021-11-22 RX ADMIN — Medication 6 MILLIGRAM(S): at 01:48

## 2021-11-22 RX ADMIN — Medication 1 PATCH: at 13:42

## 2021-11-22 RX ADMIN — Medication 1 PATCH: at 11:00

## 2021-11-22 RX ADMIN — Medication 9 MILLIGRAM(S): at 22:01

## 2021-11-23 LAB — SARS-COV-2 RNA SPEC QL NAA+PROBE: SIGNIFICANT CHANGE UP

## 2021-11-23 RX ADMIN — Medication 1 PATCH: at 07:00

## 2021-11-23 RX ADMIN — Medication 1 PATCH: at 13:33

## 2021-11-23 RX ADMIN — Medication 1 PATCH: at 11:16

## 2021-11-23 RX ADMIN — Medication 9 MILLIGRAM(S): at 22:07

## 2021-11-23 NOTE — DISCHARGE NOTE NURSING/CASE MANAGEMENT/SOCIAL WORK - PATIENT PORTAL LINK FT
You can access the FollowMyHealth Patient Portal offered by [facility] by registering at the following website: [email or web address]. By joining [facility]’s FollowMyHealth portal, you will also be able to view your health information using other applications (apps) compatible with our system.

## 2021-11-24 LAB
ANION GAP SERPL CALC-SCNC: 10 MMOL/L — SIGNIFICANT CHANGE UP (ref 7–14)
BUN SERPL-MCNC: 31 MG/DL — HIGH (ref 7–23)
CALCIUM SERPL-MCNC: 9.8 MG/DL — SIGNIFICANT CHANGE UP (ref 8.4–10.5)
CHLORIDE SERPL-SCNC: 105 MMOL/L — SIGNIFICANT CHANGE UP (ref 98–107)
CO2 SERPL-SCNC: 27 MMOL/L — SIGNIFICANT CHANGE UP (ref 22–31)
CREAT SERPL-MCNC: 0.69 MG/DL — SIGNIFICANT CHANGE UP (ref 0.5–1.3)
GLUCOSE SERPL-MCNC: 74 MG/DL — SIGNIFICANT CHANGE UP (ref 70–99)
HCT VFR BLD CALC: 38.5 % — SIGNIFICANT CHANGE UP (ref 34.5–45)
HGB BLD-MCNC: 11.8 G/DL — SIGNIFICANT CHANGE UP (ref 11.5–15.5)
MAGNESIUM SERPL-MCNC: 2.4 MG/DL — SIGNIFICANT CHANGE UP (ref 1.6–2.6)
MCHC RBC-ENTMCNC: 22.1 PG — LOW (ref 27–34)
MCHC RBC-ENTMCNC: 30.6 GM/DL — LOW (ref 32–36)
MCV RBC AUTO: 72 FL — LOW (ref 80–100)
NRBC # BLD: 0 /100 WBCS — SIGNIFICANT CHANGE UP
NRBC # FLD: 0 K/UL — SIGNIFICANT CHANGE UP
PHOSPHATE SERPL-MCNC: 4 MG/DL — SIGNIFICANT CHANGE UP (ref 2.5–4.5)
PLATELET # BLD AUTO: 295 K/UL — SIGNIFICANT CHANGE UP (ref 150–400)
POTASSIUM SERPL-MCNC: 4.9 MMOL/L — SIGNIFICANT CHANGE UP (ref 3.5–5.3)
POTASSIUM SERPL-SCNC: 4.9 MMOL/L — SIGNIFICANT CHANGE UP (ref 3.5–5.3)
RBC # BLD: 5.35 M/UL — HIGH (ref 3.8–5.2)
RBC # FLD: 15.3 % — HIGH (ref 10.3–14.5)
SODIUM SERPL-SCNC: 142 MMOL/L — SIGNIFICANT CHANGE UP (ref 135–145)
WBC # BLD: 5.84 K/UL — SIGNIFICANT CHANGE UP (ref 3.8–10.5)
WBC # FLD AUTO: 5.84 K/UL — SIGNIFICANT CHANGE UP (ref 3.8–10.5)

## 2021-11-24 PROCEDURE — 99233 SBSQ HOSP IP/OBS HIGH 50: CPT

## 2021-11-24 RX ADMIN — Medication 1 PATCH: at 19:30

## 2021-11-24 RX ADMIN — Medication 1 PATCH: at 11:17

## 2021-11-24 RX ADMIN — Medication 1 PATCH: at 15:33

## 2021-11-24 RX ADMIN — Medication 9 MILLIGRAM(S): at 22:11

## 2021-11-24 NOTE — BH CONSULTATION LIAISON PROGRESS NOTE - NSICDXBHPRIMARYDX_PSY_ALL_CORE
Adjustment disorder with depressed mood   F43.21  

## 2021-11-24 NOTE — BH CONSULTATION LIAISON PROGRESS NOTE - NSCDBILL_PSY_A_CORE
[id number] - Inpatient, high complexity
[id number] - Inpatient, high complexity
[id number] - Inpatient, high complexity

## 2021-11-24 NOTE — BH CONSULTATION LIAISON PROGRESS NOTE - NSBHCONSULTFOLLOW_PSY_ALL_CORE
No, psychiatric follow up needed - call with questions...
Yes...
No, psychiatric follow up needed - call with questions...

## 2021-11-24 NOTE — BH CONSULTATION LIAISON PROGRESS NOTE - CURRENT MEDICATION
MEDICATIONS  (STANDING):  cefTRIAXone   IVPB 1000 milliGRAM(s) IV Intermittent every 24 hours    MEDICATIONS  (PRN):  acetaminophen     Tablet .. 650 milliGRAM(s) Oral every 6 hours PRN Temp greater or equal to 38C (100.4F), Mild Pain (1 - 3), Moderate Pain (4 - 6)  
MEDICATIONS  (STANDING):  melatonin 9 milliGRAM(s) Oral at bedtime  nicotine -  14 mG/24Hr(s) Patch 1 patch Transdermal daily    MEDICATIONS  (PRN):  acetaminophen     Tablet .. 650 milliGRAM(s) Oral every 6 hours PRN Temp greater or equal to 38C (100.4F), Mild Pain (1 - 3), Moderate Pain (4 - 6)  hydrOXYzine hydrochloride 25 milliGRAM(s) Oral every 6 hours PRN Anxiety  
MEDICATIONS  (STANDING):  cefTRIAXone   IVPB 1000 milliGRAM(s) IV Intermittent every 24 hours  melatonin 3 milliGRAM(s) Oral at bedtime  nicotine -  14 mG/24Hr(s) Patch 1 patch Transdermal daily    MEDICATIONS  (PRN):  acetaminophen     Tablet .. 650 milliGRAM(s) Oral every 6 hours PRN Temp greater or equal to 38C (100.4F), Mild Pain (1 - 3), Moderate Pain (4 - 6)  hydrOXYzine hydrochloride 25 milliGRAM(s) Oral every 6 hours PRN Anxiety

## 2021-11-24 NOTE — BH CONSULTATION LIAISON PROGRESS NOTE - NSBHASSESSMENTFT_PSY_ALL_CORE
35/F with self reported hx of depression and anxiety; Per Psyckes: with multiple diagnoses of MDD, unspecified anxiety disorder, other psychoactive substance related disorder and opioid related disorder. Has remote psych admission during her teen age yrs following an attempted OD on pills (reportedly at age 17);  denied engaging in any self injurious behaviors.  Today, presented to the ED BIB EMS (from home) for a medical and psychiatric evaluation.  Pt claimed she has been feeling depressed  whereas mother reported that the Pt has not been eating well with resultant weight loss.  Patient currently does admit to feeling depressed, but feels situational variables are what are causing her mood symptoms. Denies current SI, has had passive SI of "not wanting to be around" within the past month, but denies any intent or plan to harm herself. Patient is not  currently interested in taking psychiatric medications, not interested in medication to stimulate appetite at this time.     RECOMMENDATIONS:   1. No psychiatric indication for 1:1 or inpatient psych care  2. Pt not amenable to taking psychiatric medications at this time, does not warrant involuntary psych admission/treatment given low risk for self harm.  3. Can give atarax 25mg q6prn po for anxiety. Can be prescribed as outpatient if she desires.   4. Would minimize iatrogenic harm/prolonged hospitalization if there are no significant medical targets; support discharge to outpatient model of care as soon as is safe to do so.   5. Psychiatric acuity is low combined with low motivation/insight in patient and no impetus for involuntary care; as such we will sign off for now but please call us if questions/concerns arise. 
35/F with self reported hx of depression and anxiety; Per Psyckes: with multiple diagnoses of MDD, unspecified anxiety disorder, other psychoactive substance related disorder and opioid related disorder. Has remote psych admission during her teen age yrs following an attempted OD on pills (reportedly at age 17);  denied engaging in any self injurious behaviors.  Today, presented to the ED BIB EMS (from home) for a medical and psychiatric evaluation.  Pt claimed she has been feeling depressed  whereas mother reported that the Pt has not been eating well with resultant weight loss.  Patient currently does admit to feeling depressed, but feels situational variables are what are causing her mood symptoms. Denies current SI, has had passive SI of "not wanting to be around" within the past month, but denies any intent or plan to harm herself. Patient is not  currently interested in taking psychiatric medications, not interested in medication to stimulate appetite at this time.     RECOMMENDATIONS:   1. No psychiatric indication for 1:1  2. Pt not amenable to taking psychiatric medications at this time, does not warrant involuntary psych admission/treatment given low risk for self harm.  3. Can give atarax 25mg q6prn po for anxiety.   3. Psychiatry available as needed 
35/F with self reported hx of depression and anxiety; Per Psyckes: with multiple diagnoses of MDD, unspecified anxiety disorder, other psychoactive substance related disorder and opioid related disorder. Has remote psych admission during her teen age yrs following an attempted OD on pills (reportedly at age 17);  denied engaging in any self injurious behaviors.  Today, presented to the ED BIB EMS (from home) for a medical and psychiatric evaluation.  Pt claimed she has been feeling depressed  whereas mother reported that the Pt has not been eating well with resultant weight loss.  Patient currently does admit to feeling depressed, but feels situational variables are what are causing her mood symptoms. Denies current SI, has had passive SI of "not wanting to be around" within the past month, but denies any intent or plan to harm herself. Patient is not  currently interested in taking psychiatric medications, not interested in medication to stimulate appetite at this time.     RECOMMENDATIONS:   1. No psychiatric indication for 1:1 or inpatient psych care  2. Pt not amenable to taking psychiatric medications at this time, does not warrant involuntary psych admission/treatment given low risk for self harm.  3. Can give atarax 25mg q6prn po for anxiety. Can be prescribed as outpatient if she desires.   4. Would minimize iatrogenic harm/prolonged hospitalization if there are no significant medical targets; support discharge to outpatient model of care ASAP as there appears to be nothing happening in the inpatient setting that couldn't happen in the outpatient setting   5. Of note, pt is a competent adult and can make decisions on her own; mother's concern is short of anything imminently dangerous. Please arrange dispo planning with patient directly.  6. Psychiatric acuity is low combined with low motivation/insight in patient and no impetus for involuntary care; as such we will (once again) sign off for now but please call us if questions/concerns arise.

## 2021-11-24 NOTE — BH CONSULTATION LIAISON PROGRESS NOTE - MSE UNSTRUCTURED FT
On exam, pt was playing with computer, lethargic. She is O x 3, in NAD but dysphoric/slowed/monotone. Says she is unsure why she is still in the hospital. Denied safety concerns, SI, HI, or psychosis/yaz. 
On exam today, she is in stretcher in hallway, calm, AA O x 3, with no focal neuro deficits, playing on computer calmly. She is cooperative but also disinterested in psychiatric interventions and remains uninterested in medication therapy. Denies SI, HI, paranoia, safety concerns.

## 2021-11-24 NOTE — BH CONSULTATION LIAISON PROGRESS NOTE - NSBHFUPINTERVALHXFT_PSY_A_CORE
Patient seen in ED, chart reviewed. Patient describes spending most of her time sleeping, reports taking melatonin sometimes up to 12mg in order to sleep. States she wants to sleep all the time because it helps her to cope with feelings of depression. Living at her brother's house and is unhappy with this living situation, which she feels is contributing to her depressed mood. States last time she took Tianeptine was about 1 month ago, made the decision to stop using it because she said it wasn't very helpful. Denies cravings, but describes a withdrawal period during which she experienced decreased appetite and anxiety. Also has been weak/unsteady on her feet for about Still with decreased appetite, denies intentionally trying to lose weight, does not count calories/track her intake or restrict intake. She thinks part of the reason she hasn't been eating very much is because she has been sleeping throughout most of the day. Denies current SI/HI, denies AVH, no apparent paranoia or other delusional beliefs.  Pt not interested in seeing a psychiatrist, would consider seeing a therapist. 
Chart reviewed. pt seen as there was some expressed concern from mother about discharge plans. On exam, pt was playing with computer, lethargic. She is O x 3, in NAD but dysphoric/slowed/monotone. Says she is unsure why she is still in the hospital. Denied safety concerns, SI, HI, or psychosis/yaz. Says she is simply refeeding here. She did not request psychiatric intervention.     Attempted to call primary team hospitalist (Dr. Veliz) to clarify goals of this admission/etc but he did not answer; left message with call back number.
Chart reviewed. Received Atarax PRN x 1 only. On exam today, she is in stretcher in hallway, calm, AA O x 3, with no focal neuro deficits, playing on computer calmly. She is cooperative but also disinterested in psychiatric interventions and remains uninterested in medication therapy. Denies SI, HI, paranoia, safety concerns. Unclear what medical goals are.

## 2021-11-24 NOTE — BH CONSULTATION LIAISON PROGRESS NOTE - NSBHCHARTREVIEWVS_PSY_A_CORE FT
Vital Signs Last 24 Hrs  T(C): 36.4 (24 Nov 2021 09:57), Max: 36.7 (24 Nov 2021 05:37)  T(F): 97.6 (24 Nov 2021 09:57), Max: 98.1 (24 Nov 2021 05:37)  HR: 67 (24 Nov 2021 09:57) (67 - 85)  BP: 100/61 (24 Nov 2021 09:57) (94/69 - 106/66)  BP(mean): --  RR: 18 (24 Nov 2021 09:57) (18 - 18)  SpO2: 100% (24 Nov 2021 09:57) (99% - 100%)
Vital Signs Last 24 Hrs  T(C): 36.7 (18 Nov 2021 05:15), Max: 37.3 (17 Nov 2021 20:49)  T(F): 98 (18 Nov 2021 05:15), Max: 99.2 (17 Nov 2021 20:49)  HR: 71 (18 Nov 2021 05:15) (62 - 98)  BP: 107/61 (18 Nov 2021 05:15) (99/64 - 107/75)  BP(mean): --  RR: 16 (18 Nov 2021 05:15) (16 - 17)  SpO2: 100% (18 Nov 2021 05:15) (99% - 100%)
Vital Signs Last 24 Hrs  T(C): 36.7 (17 Nov 2021 11:28), Max: 36.9 (17 Nov 2021 03:36)  T(F): 98 (17 Nov 2021 11:28), Max: 98.4 (17 Nov 2021 03:36)  HR: 62 (17 Nov 2021 11:28) (62 - 88)  BP: 104/70 (17 Nov 2021 11:28) (102/68 - 111/86)  BP(mean): --  RR: 17 (17 Nov 2021 11:28) (16 - 18)  SpO2: 99% (17 Nov 2021 11:28) (98% - 100%)

## 2021-11-25 VITALS
TEMPERATURE: 98 F | RESPIRATION RATE: 18 BRPM | SYSTOLIC BLOOD PRESSURE: 99 MMHG | HEART RATE: 76 BPM | DIASTOLIC BLOOD PRESSURE: 54 MMHG | OXYGEN SATURATION: 100 %

## 2021-11-25 LAB
ANION GAP SERPL CALC-SCNC: 9 MMOL/L — SIGNIFICANT CHANGE UP (ref 7–14)
BUN SERPL-MCNC: 28 MG/DL — HIGH (ref 7–23)
CALCIUM SERPL-MCNC: 9.3 MG/DL — SIGNIFICANT CHANGE UP (ref 8.4–10.5)
CHLORIDE SERPL-SCNC: 106 MMOL/L — SIGNIFICANT CHANGE UP (ref 98–107)
CO2 SERPL-SCNC: 26 MMOL/L — SIGNIFICANT CHANGE UP (ref 22–31)
CREAT SERPL-MCNC: 0.71 MG/DL — SIGNIFICANT CHANGE UP (ref 0.5–1.3)
GLUCOSE SERPL-MCNC: 84 MG/DL — SIGNIFICANT CHANGE UP (ref 70–99)
HCT VFR BLD CALC: 36.5 % — SIGNIFICANT CHANGE UP (ref 34.5–45)
HGB BLD-MCNC: 11.5 G/DL — SIGNIFICANT CHANGE UP (ref 11.5–15.5)
MAGNESIUM SERPL-MCNC: 2.1 MG/DL — SIGNIFICANT CHANGE UP (ref 1.6–2.6)
MCHC RBC-ENTMCNC: 21.9 PG — LOW (ref 27–34)
MCHC RBC-ENTMCNC: 31.5 GM/DL — LOW (ref 32–36)
MCV RBC AUTO: 69.4 FL — LOW (ref 80–100)
NRBC # BLD: 0 /100 WBCS — SIGNIFICANT CHANGE UP
NRBC # FLD: 0 K/UL — SIGNIFICANT CHANGE UP
PHOSPHATE SERPL-MCNC: 4 MG/DL — SIGNIFICANT CHANGE UP (ref 2.5–4.5)
PLATELET # BLD AUTO: 245 K/UL — SIGNIFICANT CHANGE UP (ref 150–400)
POTASSIUM SERPL-MCNC: 4 MMOL/L — SIGNIFICANT CHANGE UP (ref 3.5–5.3)
POTASSIUM SERPL-SCNC: 4 MMOL/L — SIGNIFICANT CHANGE UP (ref 3.5–5.3)
RBC # BLD: 5.26 M/UL — HIGH (ref 3.8–5.2)
RBC # FLD: 15.3 % — HIGH (ref 10.3–14.5)
SODIUM SERPL-SCNC: 141 MMOL/L — SIGNIFICANT CHANGE UP (ref 135–145)
WBC # BLD: 6.18 K/UL — SIGNIFICANT CHANGE UP (ref 3.8–10.5)
WBC # FLD AUTO: 6.18 K/UL — SIGNIFICANT CHANGE UP (ref 3.8–10.5)

## 2021-11-25 RX ADMIN — Medication 1 PATCH: at 06:25

## 2021-11-25 NOTE — PROGRESS NOTE ADULT - REASON FOR ADMISSION
Failure to Thrive

## 2021-11-25 NOTE — PROVIDER CONTACT NOTE (OTHER) - SITUATION
Patient with wobbly gait and demonstrates fall with harm risk. Pt educated on bed alarm and utilizing call light for assistance. Pt verbalizes understanding but continuously does not use call light.
Patient BP 99/54, parameter to notify provider for SBP<100, DBP<55. Patient denies symptoms, ready for discharge.
BP of 99/65, asymptomatic.
Patient BP 95/56, parameter to notify provider for SBP <100.
BP of 110/54

## 2021-11-25 NOTE — PROVIDER CONTACT NOTE (OTHER) - ACTION/TREATMENT ORDERED:
Provider was notified
Provider was notified
ACP made aware of situation.
ACP made aware.
ACP made aware.

## 2021-11-25 NOTE — PROVIDER CONTACT NOTE (OTHER) - BACKGROUND
35 year old female admitted for failure to thrive in adult. PMH depression.
35F with history of skull fracture and depression, admitted for FTT and a UTI.
35 year old female admitted for failure to thrive in adult. PMH depression.

## 2021-11-25 NOTE — PROGRESS NOTE ADULT - SUBJECTIVE AND OBJECTIVE BOX
Patient seen and examined at the bedside       =========================================================================================  T(C): 36.7 (11-24-21 @ 05:37), Max: 36.7 (11-24-21 @ 05:37)  HR: 73 (11-24-21 @ 05:37) (73 - 85)  BP: 94/69 (11-24-21 @ 05:37) (94/69 - 106/66)  RR: 18 (11-24-21 @ 05:37) (18 - 18)  SpO2: 100% (11-24-21 @ 05:37) (99% - 100%)    PHYSICAL EXAM.  General: NAD. comfortable. alert and oriented X 3. Thin   HEENT: NACT, EOMI. anicteric sclera  CV: RRR, normal S1 + S2, no m/r/g.  no edema  Lungs: normal work of breathing, no wheezing, rales, rhonchi. no accessory muscle use   Abd: soft, non-tender to palpation. non-distended. no guarding. + bowel sounds  Ext: No cyanosis or clubbing  Skin: no rashes  Neuro: no gross focal deficits  Psych: Calm and cooperative. no suicidal ideation         I&O's Summary    Daily     Daily     =========================================================================================  LABS.    11-24    142  |  105  |  31<H>  ----------------------------<  74  4.9   |  27  |  0.69    Ca    9.8      24 Nov 2021 07:59  Phos  4.0     11-24  Mg     2.40     11-24                            11.8   5.84  )-----------( 295      ( 24 Nov 2021 07:59 )             38.5                 COVID-19 PCR: NotDetec (23 Nov 2021 07:44)  COVID-19 PCR: NotDetec (17 Nov 2021 00:07)    =========================================================================================  IMAGING.     =========================================================================================    DIET.    Diet, Regular:   Kosher  Supplement Feeding Modality:  Oral  Ensure Enlive Cans or Servings Per Day:  1       Frequency:  Two Times a day (11-17-21 @ 13:29)          =========================================================================================    HOSPITAL MEDS.    MEDICATIONS  (STANDING):  melatonin 9 milliGRAM(s) Oral at bedtime  nicotine -  14 mG/24Hr(s) Patch 1 patch Transdermal daily    MEDICATIONS  (PRN):  acetaminophen     Tablet .. 650 milliGRAM(s) Oral every 6 hours PRN Temp greater or equal to 38C (100.4F), Mild Pain (1 - 3), Moderate Pain (4 - 6)  hydrOXYzine hydrochloride 25 milliGRAM(s) Oral every 6 hours PRN Anxiety  
Name of Patient : DEVORAH MENDELSON  MRN: [id number]  Date of visit: 11-20-21 @ 19:00      Subjective: Patient seen and examined. No new events except as noted.   calm      REVIEW OF SYSTEMS:    CONSTITUTIONAL: No weakness, fevers or chills  EYES/ENT: No visual changes;  No vertigo or throat pain   NECK: No pain or stiffness  RESPIRATORY: No cough, wheezing, hemoptysis; No shortness of breath  CARDIOVASCULAR: No chest pain or palpitations  GASTROINTESTINAL: No abdominal or epigastric pain. No nausea, vomiting, or hematemesis; No diarrhea or constipation. No melena or hematochezia.  GENITOURINARY: No dysuria, frequency or hematuria  NEUROLOGICAL: No numbness or weakness  SKIN: No itching, burning, rashes, or lesions   All other review of systems is negative unless indicated above.    MEDICATIONS:  MEDICATIONS  (STANDING):  melatonin 3 milliGRAM(s) Oral at bedtime  nicotine -  14 mG/24Hr(s) Patch 1 patch Transdermal daily      PHYSICAL EXAM:  T(C): 36.8 (11-20-21 @ 17:15), Max: 36.8 (11-20-21 @ 17:15)  HR: 71 (11-20-21 @ 17:15) (68 - 71)  BP: 101/61 (11-20-21 @ 17:15) (95/56 - 101/61)  RR: 18 (11-20-21 @ 17:15) (17 - 18)  SpO2: 100% (11-20-21 @ 17:15) (100% - 100%)  Wt(kg): --  I&O's Summary        Appearance: Normal	  HEENT:  PERRLA   Lymphatic: No lymphadenopathy   Cardiovascular: Normal S1 S2, no JVD  Respiratory: normal effort , clear  Gastrointestinal:  Soft, Non-tender  Skin: No rashes,  warm to touch  Psychiatry:  Mood & affect appropriate  Musculuskeletal: No edema      All labs, Imaging and EKGs personally reviewed                           9.8    6.85  )-----------( 247      ( 20 Nov 2021 06:30 )             32.1               11-20    143  |  109<H>  |  21  ----------------------------<  88  4.1   |  25  |  0.62    Ca    8.8      20 Nov 2021 06:30  Phos  3.3     11-20  Mg     2.00     11-20    TPro  6.2  /  Alb  3.8  /  TBili  0.4  /  DBili  x   /  AST  13  /  ALT  12  /  AlkPhos  100  11-20                                           
Name of Patient : DEVORAH MENDELSON  MRN: [id number]  Date of visit: 11-25-21       Subjective: Patient seen and examined. No new events except as noted.     REVIEW OF SYSTEMS:    CONSTITUTIONAL: No weakness, fevers or chills  EYES/ENT: No visual changes;  No vertigo or throat pain   NECK: No pain or stiffness  RESPIRATORY: No cough, wheezing, hemoptysis; No shortness of breath  CARDIOVASCULAR: No chest pain or palpitations  GASTROINTESTINAL: No abdominal or epigastric pain. No nausea, vomiting, or hematemesis; No diarrhea or constipation. No melena or hematochezia.  GENITOURINARY: No dysuria, frequency or hematuria  NEUROLOGICAL: No numbness or weakness  SKIN: No itching, burning, rashes, or lesions   All other review of systems is negative unless indicated above.    MEDICATIONS:  MEDICATIONS  (STANDING):  melatonin 9 milliGRAM(s) Oral at bedtime  nicotine -  14 mG/24Hr(s) Patch 1 patch Transdermal daily      PHYSICAL EXAM:  T(C): 36.8 (11-25-21 @ 11:50), Max: 36.8 (11-25-21 @ 11:50)  HR: 76 (11-25-21 @ 11:50) (71 - 83)  BP: 99/54 (11-25-21 @ 11:50) (96/63 - 102/60)  RR: 18 (11-25-21 @ 11:50) (18 - 18)  SpO2: 100% (11-25-21 @ 11:50) (100% - 100%)  Wt(kg): --  I&O's Summary        Appearance: Normal	  HEENT:  PERRLA   Lymphatic: No lymphadenopathy   Cardiovascular: Normal S1 S2, no JVD  Respiratory: normal effort , clear  Gastrointestinal:  Soft, Non-tender  Skin: No rashes,  warm to touch  Psychiatry:  Mood & affect appropriate  Musculuskeletal: No edema      All labs, Imaging and EKGs personally reviewed                           11.5   6.18  )-----------( 245      ( 25 Nov 2021 07:35 )             36.5               11-25    141  |  106  |  28<H>  ----------------------------<  84  4.0   |  26  |  0.71    Ca    9.3      25 Nov 2021 07:35  Phos  4.0     11-25  Mg     2.10     11-25                                             
  Patient seen and examined at the bedside this AM - doing well, no acute overnight events reported. Feeling ok, but is tired. No nausea/vomiting. no chest pain/chest pressure. Denies any lightheadedness/dizziness.       =========================================================================================  T(C): 36.9 (11-22-21 @ 05:00), Max: 36.9 (11-22-21 @ 05:00)  HR: 61 (11-22-21 @ 05:00) (61 - 83)  BP: 93/66 (11-22-21 @ 05:00) (93/66 - 110/54)  RR: 18 (11-22-21 @ 05:00) (18 - 18)  SpO2: 100% (11-22-21 @ 05:00) (99% - 100%)    PHYSICAL EXAM.  General: awake, alert, oriented X 3, well nourished, no acute distress  HEENT: NACT, EOMI. anicteric sclera  CV: RRR, normal S1 + S2, no m/r/g.  no edema  Lungs: normal work of breathing, no wheezing, rales, rhonchi. no accessory muscle use   Abd: soft, non-tender to palpation. non-distended. no guarding. + bowel sounds  Ext: No cyanosis or clubbing  Skin: warm, dry, no rashes/lesions/ulcerations  Neuro: CN II-XII grossly intact, normal strength and sensation, no focal deficits noted   Psych: Calm and cooperative         I&O's Summary    Daily     Daily     =========================================================================================  LABS.    11-22    139  |  106  |  24<H>  ----------------------------<  85  4.1   |  26  |  0.66    Ca    9.4      22 Nov 2021 06:40  Phos  3.5     11-22  Mg     2.10     11-22    TPro  6.7  /  Alb  4.1  /  TBili  0.3  /  DBili  x   /  AST  18  /  ALT  13  /  AlkPhos  106  11-22                          10.5   6.12  )-----------( 224      ( 22 Nov 2021 06:40 )             32.6     LIVER FUNCTIONS - ( 22 Nov 2021 06:40 )  Alb: 4.1 g/dL / Pro: 6.7 g/dL / ALK PHOS: 106 U/L / ALT: 13 U/L / AST: 18 U/L / GGT: x                     COVID-19 PCR: NotDetec (17 Nov 2021 00:07)    =========================================================================================  IMAGING.     =========================================================================================    DIET.    Diet, Regular:   Kosher  Supplement Feeding Modality:  Oral  Ensure Enlive Cans or Servings Per Day:  1       Frequency:  Two Times a day (11-17-21 @ 13:29)        =========================================================================================    HOSPITAL MEDS.    MEDICATIONS  (STANDING):  melatonin 9 milliGRAM(s) Oral at bedtime  nicotine -  14 mG/24Hr(s) Patch 1 patch Transdermal daily    MEDICATIONS  (PRN):  acetaminophen     Tablet .. 650 milliGRAM(s) Oral every 6 hours PRN Temp greater or equal to 38C (100.4F), Mild Pain (1 - 3), Moderate Pain (4 - 6)  hydrOXYzine hydrochloride 25 milliGRAM(s) Oral every 6 hours PRN Anxiety  
  Patient seen and examined at the bedside this AM -doing well, has eating breakfast this AM. Feeling better overall, does not reports thoughts of harming self or others. NO chest pain/chest pressure. Reports not wanting to eat for about a month; she just does not feel like it. Took herself off  one of her medications recently, it was an a supplement for depression that she was on for years. NO chest in vision or bluriness reported. No shortness of breath or coughing, no hemoptysis. Weight loss. Does use a vape pen       =========================================================================================  T(C): 36.7 (21 @ 11:50), Max: 37.3 (21 @ 20:49)  HR: 68 (21 @ 11:50) (68 - 98)  BP: 100/67 (21 @ 11:50) (100/67 - 107/75)  RR: 17 (21 @ 11:50) (16 - 17)  SpO2: 99% (21 @ 11:50) (99% - 100%)    PHYSICAL EXAM.  General: NAD, comfortable. malnurished. cachectic. alert and oriented X 3   HEENT: NACT, EOMI. anicteric sclera  CV: RRR, normal S1 + S2, no m/r/g.  no edema  Lungs: normal work of breathing, no wheezing, rales, rhonchi. no accessory muscle use   Abd: soft, non-tender to palpation. non-distended. no guarding. + bowel sounds  Ext: No cyanosis or clubbing  Skin: nono focal deficits  Psych: Calm and cooperative. no suicidal ideation         I&O's Summary    Daily     Daily     =========================================================================================  LABS.    -18    141  |  107  |  25<H>  ----------------------------<  90  4.4   |  24  |  0.75    Ca    9.3      2021 07:10  Phos  4.3     11-16  Mg     2.40     -    TPro  7.0  /  Alb  4.4  /  TBili  0.5  /  DBili  x   /  AST  14  /  ALT  14  /  AlkPhos  119  -                          11.4   6.56  )-----------( 298      ( 2021 07:10 )             36.7     LIVER FUNCTIONS - ( 2021 11:32 )  Alb: 4.4 g/dL / Pro: 7.0 g/dL / ALK PHOS: 119 U/L / ALT: 14 U/L / AST: 14 U/L / GGT: x                 Urinalysis Basic - ( 2021 01:49 )    Color: Yellow / Appearance: Slightly Turbid / S.028 / pH: x  Gluc: x / Ketone: Negative  / Bili: Negative / Urobili: <2 mg/dL   Blood: x / Protein: 30 mg/dL / Nitrite: Positive   Leuk Esterase: Moderate / RBC: 0-2 /HPF / WBC 25-50 /HPF   Sq Epi: x / Non Sq Epi: Few / Bacteria: Many        COVID-19 PCR: NotDetec (2021 00:07)    =========================================================================================  IMAGING.     =========================================================================================    DIET.    Diet, Regular:   Kosher  Supplement Feeding Modality:  Oral  Ensure Enlive Cans or Servings Per Day:  1       Frequency:  Two Times a day (21 @ 13:29)        =========================================================================================    HOSPITAL MEDS.    MEDICATIONS  (STANDING):  cefTRIAXone   IVPB 1000 milliGRAM(s) IV Intermittent every 24 hours  melatonin 3 milliGRAM(s) Oral at bedtime  nicotine -  14 mG/24Hr(s) Patch 1 patch Transdermal daily    MEDICATIONS  (PRN):  acetaminophen     Tablet .. 650 milliGRAM(s) Oral every 6 hours PRN Temp greater or equal to 38C (100.4F), Mild Pain (1 - 3), Moderate Pain (4 - 6)  hydrOXYzine hydrochloride 25 milliGRAM(s) Oral every 6 hours PRN Anxiety  
Name of Patient : DEVORAH MENDELSON  MRN: [id number]  Date of visit: 11-21-21 @ 22:00      Subjective: Patient seen and examined. No new events except as noted.   Doing okay     REVIEW OF SYSTEMS:    CONSTITUTIONAL: No weakness, fevers or chills  EYES/ENT: No visual changes;  No vertigo or throat pain   NECK: No pain or stiffness  RESPIRATORY: No cough, wheezing, hemoptysis; No shortness of breath  CARDIOVASCULAR: No chest pain or palpitations  GASTROINTESTINAL: No abdominal or epigastric pain. No nausea, vomiting, or hematemesis; No diarrhea or constipation. No melena or hematochezia.  GENITOURINARY: No dysuria, frequency or hematuria  NEUROLOGICAL: No numbness or weakness  SKIN: No itching, burning, rashes, or lesions   All other review of systems is negative unless indicated above.    MEDICATIONS:  MEDICATIONS  (STANDING):  melatonin 3 milliGRAM(s) Oral at bedtime  nicotine -  14 mG/24Hr(s) Patch 1 patch Transdermal daily      PHYSICAL EXAM:  T(C): 36.7 (11-21-21 @ 18:00), Max: 36.7 (11-21-21 @ 18:00)  HR: 76 (11-21-21 @ 18:00) (56 - 76)  BP: 103/63 (11-21-21 @ 18:00) (92/64 - 103/63)  RR: 18 (11-21-21 @ 18:00) (18 - 18)  SpO2: 100% (11-21-21 @ 18:00) (100% - 100%)  Wt(kg): --  I&O's Summary        Appearance: Normal	  HEENT:  PERRLA   Lymphatic: No lymphadenopathy   Cardiovascular: Normal S1 S2, no JVD  Respiratory: normal effort , clear  Gastrointestinal:  Soft, Non-tender  Skin: No rashes,  warm to touch  Psychiatry:  Mood & affect appropriate  Musculuskeletal: No edema      All labs, Imaging and EKGs personally reviewed                             10.8   6.62  )-----------( 225      ( 21 Nov 2021 06:31 )             33.2               11-21    139  |  105  |  20  ----------------------------<  84  4.0   |  27  |  0.64    Ca    9.4      21 Nov 2021 06:31  Phos  3.2     11-21  Mg     2.00     11-21    TPro  6.6  /  Alb  4.1  /  TBili  0.5  /  DBili  x   /  AST  16  /  ALT  13  /  AlkPhos  105  11-21                                         
Name of Patient : DEVORAH MENDELSON  MRN: [id number]  Date of visit: 11-19-21       Subjective: Patient seen and examined. No new events except as noted.   Doing okay   D.C planing     REVIEW OF SYSTEMS:    CONSTITUTIONAL: No weakness, fevers or chills  EYES/ENT: No visual changes;  No vertigo or throat pain   NECK: No pain or stiffness  RESPIRATORY: No cough, wheezing, hemoptysis; No shortness of breath  CARDIOVASCULAR: No chest pain or palpitations  GASTROINTESTINAL: No abdominal or epigastric pain. No nausea, vomiting, or hematemesis; No diarrhea or constipation. No melena or hematochezia.  GENITOURINARY: No dysuria, frequency or hematuria  NEUROLOGICAL: No numbness or weakness  SKIN: No itching, burning, rashes, or lesions   All other review of systems is negative unless indicated above.    MEDICATIONS:  MEDICATIONS  (STANDING):  melatonin 3 milliGRAM(s) Oral at bedtime  nicotine -  14 mG/24Hr(s) Patch 1 patch Transdermal daily      PHYSICAL EXAM:  T(C): 36.9 (11-19-21 @ 13:34), Max: 37.2 (11-18-21 @ 22:34)  HR: 96 (11-19-21 @ 13:34) (82 - 96)  BP: 104/58 (11-19-21 @ 13:34) (104/58 - 112/68)  RR: 16 (11-19-21 @ 13:34) (16 - 18)  SpO2: 95% (11-19-21 @ 13:34) (95% - 99%)  Wt(kg): --  I&O's Summary        Appearance: Normal	  HEENT:  PERRLA   Lymphatic: No lymphadenopathy   Cardiovascular: Normal S1 S2, no JVD  Respiratory: normal effort , clear  Gastrointestinal:  Soft, Non-tender  Skin: No rashes,  warm to touch  Psychiatry:  Mood & affect appropriate  Musculuskeletal: No edema      All labs, Imaging and EKGs personally reviewed                             11.1 9.24  )-----------( 246      ( 19 Nov 2021 06:40 )             33.9               11-19    142  |  108<H>  |  22  ----------------------------<  85  4.4   |  24  |  0.68    Ca    9.3      19 Nov 2021 06:40  Phos  4.0     11-19  Mg     2.10     11-19    TPro  6.5  /  Alb  4.2  /  TBili  0.3  /  DBili  x   /  AST  13  /  ALT  12  /  AlkPhos  108  11-19

## 2021-11-25 NOTE — PROVIDER CONTACT NOTE (OTHER) - ASSESSMENT
Patient AxOx4, resting in bed. Patient states she feels a little dizzy at the moment due to just waking up. Patient states this is normal for her.
Patient is asymptomatic, other vitals are stable.
Patient AxOx4, resting in bed. Patient denies symptoms. Other vitals stable.
Patient AxOx4. Patient with wobbly gait and demonstrates fall with harm risk. Pt educated on bed alarm and utilizing call light for assistance. Pt verbalizes understanding but continuously does not use call light.
BP of 99/65, heart rate of 68, asymptomatic.
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
## 2021-11-25 NOTE — PROVIDER CONTACT NOTE (OTHER) - REASON
Patient BP 99/54, parameter to notify provider for SBP<100, DBP<55. Pt denies symptoms, ready for d/c.
BP of 99/65
Patient BP 95/56, parameter to notify provider for SBP <100.
BP of 110/54
Patient with wobbly gait and demonstrates fall with harm risk.

## 2021-11-25 NOTE — PROGRESS NOTE ADULT - NUTRITIONAL ASSESSMENT
This patient has been assessed with a concern for Malnutrition and has been determined to have a diagnosis/diagnoses of Severe protein-calorie malnutrition and Underweight (BMI < 19).    This patient is being managed with:   Diet Regular-  Kosher  Supplement Feeding Modality:  Oral  Ensure Enlive Cans or Servings Per Day:  1       Frequency:  Two Times a day  Entered: Nov 17 2021  1:29PM    

## 2023-04-18 NOTE — BH CONSULTATION LIAISON PROGRESS NOTE - NSBHCONSULTFOLLOWAFTERCARE_PSY_A_CORE FT
PCP/outpatient providers per pt preference. Can give her Outpatient Psychiatry [facility] Contact in case she opts to use it in the future: [phone]
Can f/u with outpt providers as above
complains of pain/discomfort
Outpatient therapy follow up

## 2024-01-17 ENCOUNTER — HOSPITAL ENCOUNTER (OUTPATIENT)
Dept: RADIOLOGY | Facility: EXTERNAL LOCATION | Age: 39
Discharge: HOME | End: 2024-01-17
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
## 2024-11-25 ENCOUNTER — OFFICE VISIT (OUTPATIENT)
Dept: URGENT CARE | Age: 39
End: 2024-11-25
Payer: COMMERCIAL

## 2024-11-25 VITALS
SYSTOLIC BLOOD PRESSURE: 128 MMHG | DIASTOLIC BLOOD PRESSURE: 83 MMHG | HEIGHT: 61 IN | WEIGHT: 99 LBS | TEMPERATURE: 99.1 F | OXYGEN SATURATION: 99 % | BODY MASS INDEX: 18.69 KG/M2 | HEART RATE: 74 BPM

## 2024-11-25 DIAGNOSIS — R07.81 RIB PAIN ON LEFT SIDE: ICD-10-CM

## 2024-11-25 DIAGNOSIS — S22.42XA CLOSED FRACTURE OF MULTIPLE RIBS OF LEFT SIDE, INITIAL ENCOUNTER: Primary | ICD-10-CM

## 2024-11-25 PROCEDURE — 71101 X-RAY EXAM UNILAT RIBS/CHEST: CPT | Mod: LEFT SIDE | Performed by: STUDENT IN AN ORGANIZED HEALTH CARE EDUCATION/TRAINING PROGRAM

## 2024-11-25 PROCEDURE — 3008F BODY MASS INDEX DOCD: CPT | Performed by: STUDENT IN AN ORGANIZED HEALTH CARE EDUCATION/TRAINING PROGRAM

## 2024-11-25 PROCEDURE — 99203 OFFICE O/P NEW LOW 30 MIN: CPT | Performed by: STUDENT IN AN ORGANIZED HEALTH CARE EDUCATION/TRAINING PROGRAM

## 2024-11-25 RX ORDER — NAPROXEN 500 MG/1
500 TABLET ORAL 2 TIMES DAILY PRN
Qty: 28 TABLET | Refills: 0 | Status: SHIPPED | OUTPATIENT
Start: 2024-11-25 | End: 2024-12-09

## 2024-11-25 RX ORDER — PRIMIDONE 50 MG/1
TABLET ORAL
COMMUNITY
Start: 2024-11-06 | End: 2025-05-05

## 2024-11-25 RX ORDER — VENLAFAXINE HYDROCHLORIDE 75 MG/1
75 CAPSULE, EXTENDED RELEASE ORAL
COMMUNITY
Start: 2024-11-18 | End: 2025-02-16

## 2024-11-25 RX ORDER — MIRTAZAPINE 30 MG/1
1 TABLET, FILM COATED ORAL NIGHTLY
COMMUNITY
Start: 2024-10-21 | End: 2025-01-19

## 2024-11-25 RX ORDER — LAMOTRIGINE 25 MG/1
50 TABLET ORAL
COMMUNITY
Start: 2024-07-19

## 2024-11-25 RX ORDER — BUSPIRONE HYDROCHLORIDE 10 MG/1
10 TABLET ORAL 2 TIMES DAILY
COMMUNITY
Start: 2024-11-18

## 2024-11-25 NOTE — PROGRESS NOTES
"Subjective   Patient ID: Devorah Mendelson is a 39 y.o. female. They present today with a chief complaint of Fall (Patient thinks she fractured her left rib. Painful. Fell 2 weeks ago. ).    History of Present Illness  Patient reports that she fell down the stairs ~1.5-2 weeks ago and hurt her left ribs  Reports that her pain hasn't improved  Notes pain with inspiration   Denies use of blood thinners  Denies hitting her head  Denies LOC  Reports hx of MS, prone to falls  Notes similar injury in the past  Denies headache  No reported vomiting    Past Medical History  Allergies as of 11/25/2024    (No Known Allergies)       (Not in a hospital admission)       No past medical history on file.    No past surgical history on file.     reports that she has been smoking cigarettes. She has never been exposed to tobacco smoke. She has never used smokeless tobacco. She reports current alcohol use. She reports that she does not currently use drugs after having used the following drugs: Marijuana.                               Objective    Vitals:    11/25/24 1331   BP: 128/83   BP Location: Right arm   Patient Position: Sitting   BP Cuff Size: Child   Pulse: 74   Temp: 37.3 °C (99.1 °F)   TempSrc: Oral   SpO2: 99%   Weight: (!) 44.9 kg (99 lb)   Height: 1.549 m (5' 1\")     Patient's last menstrual period was 11/24/2024 (approximate).    Physical Exam  Constitutional:       General: She is not in acute distress.     Appearance: Normal appearance. She is not ill-appearing, toxic-appearing or diaphoretic.   HENT:      Nose: No rhinorrhea.   Eyes:      General: No scleral icterus.        Right eye: No discharge.         Left eye: No discharge.      Extraocular Movements: Extraocular movements intact.      Left eye: Nystagmus present.   Pulmonary:      Effort: Pulmonary effort is normal.   Musculoskeletal:      Cervical back: Normal range of motion.      Comments: Ttp of left anterior ribs ~6-8  No ecchymosis   No open " fracture  No erythema  No purulence   Neurological:      Mental Status: She is alert.      Coordination: Coordination abnormal.      Gait: Gait abnormal.   Psychiatric:         Mood and Affect: Mood normal.         Behavior: Behavior normal.         Thought Content: Thought content normal.      Comments: Pleasant         Procedures    Point of Care Test & Imaging Results from this visit:  No results found for this or any previous visit.    Diagnostic study results (if any) were reviewed by Chidi Mcclelland MD.    Assessment/Plan   Allergies, medications, history, and pertinent labs/EKGs/Imaging reviewed by Chidi Mcclelland MD.     Medical Decision Making:    Patient's XR is positive for fractures of the 8th & 9th rib. AFVSS satting 99% on RA. ICE, lidocaine patches, NSAIDs PRN. Return as needed. ER if symptoms worsen.     Orders and Diagnoses  Diagnoses and all orders for this visit:  Closed fracture of multiple ribs of left side, initial encounter  -     XR ribs 2 views left w chest pa or ap; Future  -     naproxen (Naprosyn) 500 mg tablet; Take 1 tablet (500 mg) by mouth 2 times a day as needed (pain) for up to 14 days.  Rib pain on left side  -     XR ribs 2 views left w chest pa or ap; Future  -     naproxen (Naprosyn) 500 mg tablet; Take 1 tablet (500 mg) by mouth 2 times a day as needed (pain) for up to 14 days.      Patient disposition: Home      Medical Admin Record      Follow Up Instructions  No follow-ups on file.    Electronically signed by Chidi Mcclelland MD  2:45 PM

## 2025-07-29 NOTE — ED BEHAVIORAL HEALTH ASSESSMENT NOTE - NS ED [facility] MED ROS CONSTITUTIONAL SYMPTOMS
ESOPHAGOGASTRODUODENOSCOPY BIOPSY done 7/28/25. Patient requesting 1 wk f/u. Please advise patient [phone] transferred to clinic  
No complaints
